# Patient Record
Sex: MALE | Employment: OTHER | URBAN - METROPOLITAN AREA
[De-identification: names, ages, dates, MRNs, and addresses within clinical notes are randomized per-mention and may not be internally consistent; named-entity substitution may affect disease eponyms.]

---

## 2017-01-01 ENCOUNTER — HOSPICE ADMISSION (OUTPATIENT)
Dept: HOSPICE | Facility: HOSPICE | Age: 82
End: 2017-01-01
Payer: MEDICARE

## 2017-01-01 ENCOUNTER — HOSPITAL ENCOUNTER (INPATIENT)
Age: 82
LOS: 12 days | End: 2017-03-28
Attending: INTERNAL MEDICINE | Admitting: INTERNAL MEDICINE

## 2017-01-01 ENCOUNTER — HOME CARE VISIT (OUTPATIENT)
Dept: HOSPICE | Facility: HOSPICE | Age: 82
End: 2017-01-01
Payer: MEDICARE

## 2017-01-01 VITALS
SYSTOLIC BLOOD PRESSURE: 62 MMHG | RESPIRATION RATE: 20 BRPM | HEART RATE: 80 BPM | DIASTOLIC BLOOD PRESSURE: 45 MMHG | TEMPERATURE: 97.5 F | OXYGEN SATURATION: 90 %

## 2017-01-01 DIAGNOSIS — G30.9 ALZHEIMER'S DEMENTIA WITH BEHAVIORAL DISTURBANCE, UNSPECIFIED TIMING OF DEMENTIA ONSET: ICD-10-CM

## 2017-01-01 DIAGNOSIS — T14.8XXA MULTIPLE WOUNDS OF SKIN: ICD-10-CM

## 2017-01-01 DIAGNOSIS — R45.1 AGITATION REQUIRING SEDATION PROTOCOL: ICD-10-CM

## 2017-01-01 DIAGNOSIS — A41.50 SEPSIS DUE TO GRAM NEGATIVE BACTERIA (HCC): ICD-10-CM

## 2017-01-01 DIAGNOSIS — F02.81 ALZHEIMER'S DEMENTIA WITH BEHAVIORAL DISTURBANCE, UNSPECIFIED TIMING OF DEMENTIA ONSET: ICD-10-CM

## 2017-01-01 PROCEDURE — 74011000250 HC RX REV CODE- 250: Performed by: INTERNAL MEDICINE

## 2017-01-01 PROCEDURE — 0656 HSPC GENERAL INPATIENT

## 2017-01-01 PROCEDURE — 74011250637 HC RX REV CODE- 250/637: Performed by: NURSE PRACTITIONER

## 2017-01-01 PROCEDURE — 0651 HSPC ROUTINE HOME CARE

## 2017-01-01 PROCEDURE — 74011250637 HC RX REV CODE- 250/637: Performed by: INTERNAL MEDICINE

## 2017-01-01 PROCEDURE — 74011000250 HC RX REV CODE- 250: Performed by: NURSE PRACTITIONER

## 2017-01-01 PROCEDURE — 74011250636 HC RX REV CODE- 250/636: Performed by: INTERNAL MEDICINE

## 2017-01-01 PROCEDURE — 99231 SBSQ HOSP IP/OBS SF/LOW 25: CPT | Performed by: INTERNAL MEDICINE

## 2017-01-01 PROCEDURE — 3336500001 HSPC ELECTION

## 2017-01-01 PROCEDURE — 99233 SBSQ HOSP IP/OBS HIGH 50: CPT | Performed by: INTERNAL MEDICINE

## 2017-01-01 PROCEDURE — 74011250636 HC RX REV CODE- 250/636: Performed by: NURSE PRACTITIONER

## 2017-01-01 PROCEDURE — 99232 SBSQ HOSP IP/OBS MODERATE 35: CPT | Performed by: FAMILY MEDICINE

## 2017-01-01 PROCEDURE — 74011250636 HC RX REV CODE- 250/636

## 2017-01-01 PROCEDURE — 99233 SBSQ HOSP IP/OBS HIGH 50: CPT | Performed by: FAMILY MEDICINE

## 2017-01-01 RX ORDER — NYSTATIN 100000 [USP'U]/G
POWDER TOPICAL 3 TIMES DAILY
Status: DISCONTINUED | OUTPATIENT
Start: 2017-01-01 | End: 2017-01-01 | Stop reason: SDUPTHER

## 2017-01-01 RX ORDER — HALOPERIDOL 5 MG/ML
2 INJECTION INTRAMUSCULAR
Status: DISCONTINUED | OUTPATIENT
Start: 2017-01-01 | End: 2017-01-01 | Stop reason: HOSPADM

## 2017-01-01 RX ORDER — GLYCOPYRROLATE 0.2 MG/ML
0.2 INJECTION INTRAMUSCULAR; INTRAVENOUS EVERY 4 HOURS
Status: DISCONTINUED | OUTPATIENT
Start: 2017-01-01 | End: 2017-01-01 | Stop reason: HOSPADM

## 2017-01-01 RX ORDER — SCOLOPAMINE TRANSDERMAL SYSTEM 1 MG/1
3 PATCH, EXTENDED RELEASE TRANSDERMAL
Status: DISCONTINUED | OUTPATIENT
Start: 2017-01-01 | End: 2017-01-01 | Stop reason: SDUPTHER

## 2017-01-01 RX ORDER — GLYCOPYRROLATE 0.2 MG/ML
0.2 INJECTION INTRAMUSCULAR; INTRAVENOUS EVERY 6 HOURS
Status: DISCONTINUED | OUTPATIENT
Start: 2017-01-01 | End: 2017-01-01

## 2017-01-01 RX ORDER — ACETAMINOPHEN 650 MG/1
650 SUPPOSITORY RECTAL
Status: DISCONTINUED | OUTPATIENT
Start: 2017-01-01 | End: 2017-01-01 | Stop reason: HOSPADM

## 2017-01-01 RX ORDER — AMOXICILLIN 250 MG
2 CAPSULE ORAL
Status: DISCONTINUED | OUTPATIENT
Start: 2017-01-01 | End: 2017-01-01 | Stop reason: SDUPTHER

## 2017-01-01 RX ORDER — HYDROMORPHONE HYDROCHLORIDE 2 MG/ML
1.5 INJECTION, SOLUTION INTRAMUSCULAR; INTRAVENOUS; SUBCUTANEOUS
Status: DISCONTINUED | OUTPATIENT
Start: 2017-01-01 | End: 2017-01-01

## 2017-01-01 RX ORDER — HALOPERIDOL 5 MG/ML
INJECTION INTRAMUSCULAR
Status: DISPENSED
Start: 2017-01-01 | End: 2017-01-01

## 2017-01-01 RX ORDER — GLYCOPYRROLATE 0.2 MG/ML
0.2 INJECTION INTRAMUSCULAR; INTRAVENOUS EVERY 4 HOURS
Status: DISCONTINUED | OUTPATIENT
Start: 2017-01-01 | End: 2017-01-01 | Stop reason: DRUGHIGH

## 2017-01-01 RX ORDER — LORAZEPAM 2 MG/ML
1 INJECTION INTRAMUSCULAR
Status: DISCONTINUED | OUTPATIENT
Start: 2017-01-01 | End: 2017-01-01 | Stop reason: HOSPADM

## 2017-01-01 RX ORDER — HYDROMORPHONE HYDROCHLORIDE 2 MG/ML
1 INJECTION, SOLUTION INTRAMUSCULAR; INTRAVENOUS; SUBCUTANEOUS EVERY 4 HOURS
Status: DISCONTINUED | OUTPATIENT
Start: 2017-01-01 | End: 2017-01-01

## 2017-01-01 RX ORDER — GLYCOPYRROLATE 0.2 MG/ML
0.2 INJECTION INTRAMUSCULAR; INTRAVENOUS
Status: DISCONTINUED | OUTPATIENT
Start: 2017-01-01 | End: 2017-01-01 | Stop reason: SDUPTHER

## 2017-01-01 RX ORDER — LORAZEPAM 2 MG/ML
0.5 INJECTION INTRAMUSCULAR EVERY 4 HOURS
Status: DISCONTINUED | OUTPATIENT
Start: 2017-01-01 | End: 2017-01-01 | Stop reason: HOSPADM

## 2017-01-01 RX ORDER — HYDROMORPHONE HYDROCHLORIDE 2 MG/ML
1 INJECTION, SOLUTION INTRAMUSCULAR; INTRAVENOUS; SUBCUTANEOUS
Status: DISCONTINUED | OUTPATIENT
Start: 2017-01-01 | End: 2017-01-01

## 2017-01-01 RX ORDER — ONDANSETRON 4 MG/1
4 TABLET, ORALLY DISINTEGRATING ORAL
Status: DISCONTINUED | OUTPATIENT
Start: 2017-01-01 | End: 2017-01-01 | Stop reason: HOSPADM

## 2017-01-01 RX ORDER — FACIAL-BODY WIPES
10 EACH TOPICAL DAILY PRN
Status: DISCONTINUED | OUTPATIENT
Start: 2017-01-01 | End: 2017-01-01

## 2017-01-01 RX ORDER — HYDROMORPHONE HYDROCHLORIDE 2 MG/ML
2 INJECTION, SOLUTION INTRAMUSCULAR; INTRAVENOUS; SUBCUTANEOUS
Status: DISCONTINUED | OUTPATIENT
Start: 2017-01-01 | End: 2017-01-01

## 2017-01-01 RX ORDER — SENNOSIDES 8.8 MG/5ML
5 LIQUID ORAL
Status: DISCONTINUED | OUTPATIENT
Start: 2017-01-01 | End: 2017-01-01

## 2017-01-01 RX ORDER — SCOLOPAMINE TRANSDERMAL SYSTEM 1 MG/1
1.5 PATCH, EXTENDED RELEASE TRANSDERMAL
Status: DISCONTINUED | OUTPATIENT
Start: 2017-01-01 | End: 2017-01-01

## 2017-01-01 RX ORDER — HYDROMORPHONE HYDROCHLORIDE 10 MG/ML
0.5 INJECTION INTRAMUSCULAR; INTRAVENOUS; SUBCUTANEOUS DAILY PRN
Status: DISCONTINUED | OUTPATIENT
Start: 2017-01-01 | End: 2017-01-01 | Stop reason: CLARIF

## 2017-01-01 RX ORDER — GLYCOPYRROLATE 0.2 MG/ML
0.2 INJECTION INTRAMUSCULAR; INTRAVENOUS EVERY 4 HOURS
Status: DISCONTINUED | OUTPATIENT
Start: 2017-01-01 | End: 2017-01-01 | Stop reason: SDUPTHER

## 2017-01-01 RX ORDER — FACIAL-BODY WIPES
10 EACH TOPICAL DAILY PRN
Status: DISCONTINUED | OUTPATIENT
Start: 2017-01-01 | End: 2017-01-01 | Stop reason: SDUPTHER

## 2017-01-01 RX ORDER — HYDROMORPHONE HYDROCHLORIDE 2 MG/ML
INJECTION, SOLUTION INTRAMUSCULAR; INTRAVENOUS; SUBCUTANEOUS
Status: DISPENSED
Start: 2017-01-01 | End: 2017-01-01

## 2017-01-01 RX ORDER — GLYCOPYRROLATE 0.2 MG/ML
0.2 INJECTION INTRAMUSCULAR; INTRAVENOUS
Status: DISCONTINUED | OUTPATIENT
Start: 2017-01-01 | End: 2017-01-01

## 2017-01-01 RX ORDER — AMOXICILLIN 250 MG
2 CAPSULE ORAL
Status: DISCONTINUED | OUTPATIENT
Start: 2017-01-01 | End: 2017-01-01 | Stop reason: HOSPADM

## 2017-01-01 RX ORDER — FACIAL-BODY WIPES
10 EACH TOPICAL DAILY PRN
Status: DISCONTINUED | OUTPATIENT
Start: 2017-01-01 | End: 2017-01-01 | Stop reason: HOSPADM

## 2017-01-01 RX ORDER — HALOPERIDOL 5 MG/ML
2 INJECTION INTRAMUSCULAR EVERY 4 HOURS
Status: DISCONTINUED | OUTPATIENT
Start: 2017-01-01 | End: 2017-01-01

## 2017-01-01 RX ORDER — NYSTATIN 100000 [USP'U]/G
POWDER TOPICAL 3 TIMES DAILY
Status: DISCONTINUED | OUTPATIENT
Start: 2017-01-01 | End: 2017-01-01 | Stop reason: HOSPADM

## 2017-01-01 RX ORDER — HYDROMORPHONE HYDROCHLORIDE 2 MG/ML
0.5 INJECTION, SOLUTION INTRAMUSCULAR; INTRAVENOUS; SUBCUTANEOUS EVERY 4 HOURS
Status: DISCONTINUED | OUTPATIENT
Start: 2017-01-01 | End: 2017-01-01

## 2017-01-01 RX ORDER — GLYCOPYRROLATE 0.2 MG/ML
0.2 INJECTION INTRAMUSCULAR; INTRAVENOUS
Status: DISCONTINUED | OUTPATIENT
Start: 2017-01-01 | End: 2017-01-01 | Stop reason: HOSPADM

## 2017-01-01 RX ORDER — HYDROMORPHONE HYDROCHLORIDE 2 MG/ML
2 INJECTION, SOLUTION INTRAMUSCULAR; INTRAVENOUS; SUBCUTANEOUS
Status: DISCONTINUED | OUTPATIENT
Start: 2017-01-01 | End: 2017-01-01 | Stop reason: HOSPADM

## 2017-01-01 RX ORDER — LORAZEPAM 2 MG/ML
1 CONCENTRATE ORAL
Status: DISCONTINUED | OUTPATIENT
Start: 2017-01-01 | End: 2017-01-01

## 2017-01-01 RX ORDER — HYDROMORPHONE HYDROCHLORIDE 2 MG/ML
2 INJECTION, SOLUTION INTRAMUSCULAR; INTRAVENOUS; SUBCUTANEOUS
Status: DISCONTINUED | OUTPATIENT
Start: 2017-01-01 | End: 2017-01-01 | Stop reason: SDUPTHER

## 2017-01-01 RX ORDER — GLYCOPYRROLATE 0.2 MG/ML
0.1 INJECTION INTRAMUSCULAR; INTRAVENOUS
Status: DISCONTINUED | OUTPATIENT
Start: 2017-01-01 | End: 2017-01-01

## 2017-01-01 RX ORDER — HALOPERIDOL 5 MG/ML
2 INJECTION INTRAMUSCULAR
Status: DISCONTINUED | OUTPATIENT
Start: 2017-01-01 | End: 2017-01-01 | Stop reason: SDUPTHER

## 2017-01-01 RX ORDER — LORAZEPAM 2 MG/ML
1 INJECTION INTRAMUSCULAR
Status: DISCONTINUED | OUTPATIENT
Start: 2017-01-01 | End: 2017-01-01 | Stop reason: SDUPTHER

## 2017-01-01 RX ORDER — ONDANSETRON 4 MG/1
4 TABLET, ORALLY DISINTEGRATING ORAL
Status: DISCONTINUED | OUTPATIENT
Start: 2017-01-01 | End: 2017-01-01 | Stop reason: SDUPTHER

## 2017-01-01 RX ORDER — HYDROMORPHONE HYDROCHLORIDE 2 MG/ML
3 INJECTION, SOLUTION INTRAMUSCULAR; INTRAVENOUS; SUBCUTANEOUS
Status: DISCONTINUED | OUTPATIENT
Start: 2017-01-01 | End: 2017-01-01 | Stop reason: HOSPADM

## 2017-01-01 RX ORDER — HYDROMORPHONE HYDROCHLORIDE 2 MG/ML
0.5 INJECTION, SOLUTION INTRAMUSCULAR; INTRAVENOUS; SUBCUTANEOUS
Status: DISCONTINUED | OUTPATIENT
Start: 2017-01-01 | End: 2017-01-01

## 2017-01-01 RX ORDER — HYDROMORPHONE HYDROCHLORIDE 2 MG/ML
0.5 INJECTION, SOLUTION INTRAMUSCULAR; INTRAVENOUS; SUBCUTANEOUS DAILY PRN
Status: DISCONTINUED | OUTPATIENT
Start: 2017-01-01 | End: 2017-01-01

## 2017-01-01 RX ORDER — ACETAMINOPHEN 650 MG/1
650 SUPPOSITORY RECTAL
Status: DISCONTINUED | OUTPATIENT
Start: 2017-01-01 | End: 2017-01-01 | Stop reason: SDUPTHER

## 2017-01-01 RX ORDER — SCOLOPAMINE TRANSDERMAL SYSTEM 1 MG/1
3 PATCH, EXTENDED RELEASE TRANSDERMAL
Status: DISCONTINUED | OUTPATIENT
Start: 2017-01-01 | End: 2017-01-01 | Stop reason: HOSPADM

## 2017-01-01 RX ADMIN — HYDROMORPHONE HYDROCHLORIDE 2 MG: 2 INJECTION, SOLUTION INTRAMUSCULAR; INTRAVENOUS; SUBCUTANEOUS at 01:56

## 2017-01-01 RX ADMIN — GLYCOPYRROLATE 0.2 MG: 0.2 INJECTION, SOLUTION INTRAMUSCULAR; INTRAVENOUS at 15:00

## 2017-01-01 RX ADMIN — NYSTATIN 15 G: 100000 POWDER TOPICAL at 08:09

## 2017-01-01 RX ADMIN — HALOPERIDOL LACTATE 2 MG: 5 INJECTION, SOLUTION INTRAMUSCULAR at 20:40

## 2017-01-01 RX ADMIN — HYDROMORPHONE HYDROCHLORIDE 1 MG: 2 INJECTION, SOLUTION INTRAMUSCULAR; INTRAVENOUS; SUBCUTANEOUS at 02:28

## 2017-01-01 RX ADMIN — HYDROMORPHONE HYDROCHLORIDE 2 MG: 2 INJECTION, SOLUTION INTRAMUSCULAR; INTRAVENOUS; SUBCUTANEOUS at 17:30

## 2017-01-01 RX ADMIN — HYDROMORPHONE HYDROCHLORIDE 0.5 MG: 2 INJECTION, SOLUTION INTRAMUSCULAR; INTRAVENOUS; SUBCUTANEOUS at 06:00

## 2017-01-01 RX ADMIN — GLYCOPYRROLATE 0.2 MG: 0.2 INJECTION, SOLUTION INTRAMUSCULAR; INTRAVENOUS at 00:50

## 2017-01-01 RX ADMIN — HYDROMORPHONE HYDROCHLORIDE 0.5 MG: 2 INJECTION, SOLUTION INTRAMUSCULAR; INTRAVENOUS; SUBCUTANEOUS at 22:12

## 2017-01-01 RX ADMIN — HALOPERIDOL LACTATE 2 MG: 5 INJECTION, SOLUTION INTRAMUSCULAR at 17:03

## 2017-01-01 RX ADMIN — HALOPERIDOL LACTATE 2 MG: 5 INJECTION, SOLUTION INTRAMUSCULAR at 23:00

## 2017-01-01 RX ADMIN — NYSTATIN: 100000 POWDER TOPICAL at 09:43

## 2017-01-01 RX ADMIN — HYDROMORPHONE HYDROCHLORIDE 0.5 MG: 2 INJECTION, SOLUTION INTRAMUSCULAR; INTRAVENOUS; SUBCUTANEOUS at 06:33

## 2017-01-01 RX ADMIN — HALOPERIDOL LACTATE 2 MG: 5 INJECTION, SOLUTION INTRAMUSCULAR at 13:38

## 2017-01-01 RX ADMIN — NYSTATIN 15 G: 100000 POWDER TOPICAL at 09:04

## 2017-01-01 RX ADMIN — HALOPERIDOL LACTATE 2 MG: 5 INJECTION, SOLUTION INTRAMUSCULAR at 15:48

## 2017-01-01 RX ADMIN — GLYCOPYRROLATE 0.2 MG: 0.2 INJECTION INTRAMUSCULAR; INTRAVENOUS at 19:58

## 2017-01-01 RX ADMIN — GLYCOPYRROLATE 0.2 MG: 0.2 INJECTION INTRAMUSCULAR; INTRAVENOUS at 21:21

## 2017-01-01 RX ADMIN — GLYCOPYRROLATE 0.2 MG: 0.2 INJECTION, SOLUTION INTRAMUSCULAR; INTRAVENOUS at 16:17

## 2017-01-01 RX ADMIN — HALOPERIDOL LACTATE 2 MG: 5 INJECTION, SOLUTION INTRAMUSCULAR at 09:50

## 2017-01-01 RX ADMIN — HYDROMORPHONE HYDROCHLORIDE 1 MG: 2 INJECTION, SOLUTION INTRAMUSCULAR; INTRAVENOUS; SUBCUTANEOUS at 02:00

## 2017-01-01 RX ADMIN — NYSTATIN: 100000 POWDER TOPICAL at 16:00

## 2017-01-01 RX ADMIN — HALOPERIDOL LACTATE 2 MG: 5 INJECTION, SOLUTION INTRAMUSCULAR at 23:20

## 2017-01-01 RX ADMIN — HYDROMORPHONE HYDROCHLORIDE 1 MG: 2 INJECTION, SOLUTION INTRAMUSCULAR; INTRAVENOUS; SUBCUTANEOUS at 23:20

## 2017-01-01 RX ADMIN — GLYCOPYRROLATE 0.2 MG: 0.2 INJECTION, SOLUTION INTRAMUSCULAR; INTRAVENOUS at 04:00

## 2017-01-01 RX ADMIN — HYDROMORPHONE HYDROCHLORIDE 1 MG: 2 INJECTION, SOLUTION INTRAMUSCULAR; INTRAVENOUS; SUBCUTANEOUS at 14:15

## 2017-01-01 RX ADMIN — LORAZEPAM 0.5 MG: 2 INJECTION INTRAMUSCULAR at 18:23

## 2017-01-01 RX ADMIN — HALOPERIDOL LACTATE 2 MG: 5 INJECTION, SOLUTION INTRAMUSCULAR at 22:12

## 2017-01-01 RX ADMIN — HALOPERIDOL LACTATE 2 MG: 5 INJECTION, SOLUTION INTRAMUSCULAR at 18:27

## 2017-01-01 RX ADMIN — HALOPERIDOL LACTATE 2 MG: 5 INJECTION, SOLUTION INTRAMUSCULAR at 05:00

## 2017-01-01 RX ADMIN — HYDROMORPHONE HYDROCHLORIDE 0.5 MG: 2 INJECTION, SOLUTION INTRAMUSCULAR; INTRAVENOUS; SUBCUTANEOUS at 02:38

## 2017-01-01 RX ADMIN — HYDROMORPHONE HYDROCHLORIDE 2 MG: 2 INJECTION, SOLUTION INTRAMUSCULAR; INTRAVENOUS; SUBCUTANEOUS at 09:04

## 2017-01-01 RX ADMIN — GLYCOPYRROLATE 0.2 MG: 0.2 INJECTION, SOLUTION INTRAMUSCULAR; INTRAVENOUS at 08:36

## 2017-01-01 RX ADMIN — NYSTATIN: 100000 POWDER TOPICAL at 08:06

## 2017-01-01 RX ADMIN — HYDROMORPHONE HYDROCHLORIDE 2 MG: 2 INJECTION, SOLUTION INTRAMUSCULAR; INTRAVENOUS; SUBCUTANEOUS at 11:17

## 2017-01-01 RX ADMIN — HALOPERIDOL LACTATE 2 MG: 5 INJECTION, SOLUTION INTRAMUSCULAR at 18:46

## 2017-01-01 RX ADMIN — GLYCOPYRROLATE 0.2 MG: 0.2 INJECTION, SOLUTION INTRAMUSCULAR; INTRAVENOUS at 08:31

## 2017-01-01 RX ADMIN — NYSTATIN: 100000 POWDER TOPICAL at 23:14

## 2017-01-01 RX ADMIN — GLYCOPYRROLATE 0.2 MG: 0.2 INJECTION, SOLUTION INTRAMUSCULAR; INTRAVENOUS at 03:19

## 2017-01-01 RX ADMIN — NYSTATIN: 100000 POWDER TOPICAL at 01:56

## 2017-01-01 RX ADMIN — HYDROMORPHONE HYDROCHLORIDE 1 MG: 2 INJECTION, SOLUTION INTRAMUSCULAR; INTRAVENOUS; SUBCUTANEOUS at 09:02

## 2017-01-01 RX ADMIN — HALOPERIDOL LACTATE 2 MG: 5 INJECTION, SOLUTION INTRAMUSCULAR at 08:37

## 2017-01-01 RX ADMIN — HALOPERIDOL LACTATE 2 MG: 5 INJECTION, SOLUTION INTRAMUSCULAR at 20:21

## 2017-01-01 RX ADMIN — GLYCOPYRROLATE 0.2 MG: 0.2 INJECTION, SOLUTION INTRAMUSCULAR; INTRAVENOUS at 20:40

## 2017-01-01 RX ADMIN — HYDROMORPHONE HYDROCHLORIDE 1.5 MG: 2 INJECTION, SOLUTION INTRAMUSCULAR; INTRAVENOUS; SUBCUTANEOUS at 08:05

## 2017-01-01 RX ADMIN — HALOPERIDOL LACTATE 2 MG: 5 INJECTION, SOLUTION INTRAMUSCULAR at 09:03

## 2017-01-01 RX ADMIN — HALOPERIDOL LACTATE 2 MG: 5 INJECTION, SOLUTION INTRAMUSCULAR at 11:15

## 2017-01-01 RX ADMIN — NYSTATIN: 100000 POWDER TOPICAL at 22:00

## 2017-01-01 RX ADMIN — HYDROMORPHONE HYDROCHLORIDE 1.5 MG: 2 INJECTION, SOLUTION INTRAMUSCULAR; INTRAVENOUS; SUBCUTANEOUS at 23:24

## 2017-01-01 RX ADMIN — HYDROMORPHONE HYDROCHLORIDE 2 MG: 2 INJECTION, SOLUTION INTRAMUSCULAR; INTRAVENOUS; SUBCUTANEOUS at 15:48

## 2017-01-01 RX ADMIN — HYDROMORPHONE HYDROCHLORIDE 2 MG: 2 INJECTION, SOLUTION INTRAMUSCULAR; INTRAVENOUS; SUBCUTANEOUS at 00:03

## 2017-01-01 RX ADMIN — HALOPERIDOL LACTATE 2 MG: 5 INJECTION, SOLUTION INTRAMUSCULAR at 17:33

## 2017-01-01 RX ADMIN — HALOPERIDOL LACTATE 2 MG: 5 INJECTION, SOLUTION INTRAMUSCULAR at 20:26

## 2017-01-01 RX ADMIN — HYDROMORPHONE HYDROCHLORIDE 1 MG: 2 INJECTION, SOLUTION INTRAMUSCULAR; INTRAVENOUS; SUBCUTANEOUS at 16:17

## 2017-01-01 RX ADMIN — HYDROMORPHONE HYDROCHLORIDE 3 MG: 2 INJECTION, SOLUTION INTRAMUSCULAR; INTRAVENOUS; SUBCUTANEOUS at 08:07

## 2017-01-01 RX ADMIN — HALOPERIDOL LACTATE 2 MG: 5 INJECTION, SOLUTION INTRAMUSCULAR at 02:26

## 2017-01-01 RX ADMIN — HYDROMORPHONE HYDROCHLORIDE 0.5 MG: 2 INJECTION, SOLUTION INTRAMUSCULAR; INTRAVENOUS; SUBCUTANEOUS at 13:58

## 2017-01-01 RX ADMIN — HALOPERIDOL LACTATE 2 MG: 5 INJECTION, SOLUTION INTRAMUSCULAR at 18:56

## 2017-01-01 RX ADMIN — HALOPERIDOL LACTATE 2 MG: 5 INJECTION, SOLUTION INTRAMUSCULAR at 14:24

## 2017-01-01 RX ADMIN — HALOPERIDOL LACTATE 2 MG: 5 INJECTION, SOLUTION INTRAMUSCULAR at 17:06

## 2017-01-01 RX ADMIN — GLYCOPYRROLATE 0.2 MG: 0.2 INJECTION, SOLUTION INTRAMUSCULAR; INTRAVENOUS at 16:35

## 2017-01-01 RX ADMIN — GLYCOPYRROLATE 0.2 MG: 0.2 INJECTION, SOLUTION INTRAMUSCULAR; INTRAVENOUS at 17:30

## 2017-01-01 RX ADMIN — HALOPERIDOL LACTATE 2 MG: 5 INJECTION, SOLUTION INTRAMUSCULAR at 15:54

## 2017-01-01 RX ADMIN — HALOPERIDOL LACTATE 2 MG: 5 INJECTION, SOLUTION INTRAMUSCULAR at 01:52

## 2017-01-01 RX ADMIN — GLYCOPYRROLATE 0.2 MG: 0.2 INJECTION INTRAMUSCULAR; INTRAVENOUS at 08:08

## 2017-01-01 RX ADMIN — HALOPERIDOL LACTATE 2 MG: 5 INJECTION, SOLUTION INTRAMUSCULAR at 08:13

## 2017-01-01 RX ADMIN — NYSTATIN: 100000 POWDER TOPICAL at 09:00

## 2017-01-01 RX ADMIN — LORAZEPAM 0.5 MG: 2 INJECTION INTRAMUSCULAR at 13:59

## 2017-01-01 RX ADMIN — ACETAMINOPHEN 650 MG: 650 SUPPOSITORY RECTAL at 16:41

## 2017-01-01 RX ADMIN — GLYCOPYRROLATE 0.2 MG: 0.2 INJECTION INTRAMUSCULAR; INTRAVENOUS at 09:03

## 2017-01-01 RX ADMIN — GLYCOPYRROLATE 0.2 MG: 0.2 INJECTION, SOLUTION INTRAMUSCULAR; INTRAVENOUS at 11:16

## 2017-01-01 RX ADMIN — HALOPERIDOL LACTATE 2 MG: 5 INJECTION, SOLUTION INTRAMUSCULAR at 03:19

## 2017-01-01 RX ADMIN — HYDROMORPHONE HYDROCHLORIDE 1.5 MG: 2 INJECTION, SOLUTION INTRAMUSCULAR; INTRAVENOUS; SUBCUTANEOUS at 02:39

## 2017-01-01 RX ADMIN — LORAZEPAM 0.5 MG: 2 INJECTION INTRAMUSCULAR at 21:43

## 2017-01-01 RX ADMIN — HALOPERIDOL LACTATE 2 MG: 5 INJECTION, SOLUTION INTRAMUSCULAR at 22:11

## 2017-01-01 RX ADMIN — HYDROMORPHONE HYDROCHLORIDE 1 MG: 2 INJECTION, SOLUTION INTRAMUSCULAR; INTRAVENOUS; SUBCUTANEOUS at 03:56

## 2017-01-01 RX ADMIN — GLYCOPYRROLATE 0.2 MG: 0.2 INJECTION, SOLUTION INTRAMUSCULAR; INTRAVENOUS at 02:13

## 2017-01-01 RX ADMIN — HALOPERIDOL LACTATE 2 MG: 5 INJECTION, SOLUTION INTRAMUSCULAR at 06:33

## 2017-01-01 RX ADMIN — HYDROMORPHONE HYDROCHLORIDE 0.5 MG: 10 INJECTION INTRAMUSCULAR; INTRAVENOUS; SUBCUTANEOUS at 10:56

## 2017-01-01 RX ADMIN — HALOPERIDOL LACTATE 2 MG: 5 INJECTION, SOLUTION INTRAMUSCULAR at 02:35

## 2017-01-01 RX ADMIN — HALOPERIDOL LACTATE 2 MG: 5 INJECTION, SOLUTION INTRAMUSCULAR at 18:31

## 2017-01-01 RX ADMIN — HALOPERIDOL LACTATE 2 MG: 5 INJECTION, SOLUTION INTRAMUSCULAR at 05:44

## 2017-01-01 RX ADMIN — NYSTATIN: 100000 POWDER TOPICAL at 17:00

## 2017-01-01 RX ADMIN — HYDROMORPHONE HYDROCHLORIDE 3 MG: 2 INJECTION, SOLUTION INTRAMUSCULAR; INTRAVENOUS; SUBCUTANEOUS at 01:42

## 2017-01-01 RX ADMIN — HYDROMORPHONE HYDROCHLORIDE 1 MG: 2 INJECTION, SOLUTION INTRAMUSCULAR; INTRAVENOUS; SUBCUTANEOUS at 08:13

## 2017-01-01 RX ADMIN — HALOPERIDOL LACTATE 2 MG: 5 INJECTION, SOLUTION INTRAMUSCULAR at 20:00

## 2017-01-01 RX ADMIN — HYDROMORPHONE HYDROCHLORIDE 0.5 MG: 2 INJECTION, SOLUTION INTRAMUSCULAR; INTRAVENOUS; SUBCUTANEOUS at 14:00

## 2017-01-01 RX ADMIN — HYDROMORPHONE HYDROCHLORIDE 2 MG: 2 INJECTION, SOLUTION INTRAMUSCULAR; INTRAVENOUS; SUBCUTANEOUS at 07:54

## 2017-01-01 RX ADMIN — HALOPERIDOL LACTATE 2 MG: 5 INJECTION, SOLUTION INTRAMUSCULAR at 13:10

## 2017-01-01 RX ADMIN — HALOPERIDOL LACTATE 2 MG: 5 INJECTION, SOLUTION INTRAMUSCULAR at 02:38

## 2017-01-01 RX ADMIN — HYDROMORPHONE HYDROCHLORIDE 3 MG: 2 INJECTION, SOLUTION INTRAMUSCULAR; INTRAVENOUS; SUBCUTANEOUS at 20:18

## 2017-01-01 RX ADMIN — HYDROMORPHONE HYDROCHLORIDE 2 MG: 2 INJECTION, SOLUTION INTRAMUSCULAR; INTRAVENOUS; SUBCUTANEOUS at 14:15

## 2017-01-01 RX ADMIN — HALOPERIDOL LACTATE 2 MG: 5 INJECTION, SOLUTION INTRAMUSCULAR at 14:14

## 2017-01-01 RX ADMIN — HYDROMORPHONE HYDROCHLORIDE 1.5 MG: 2 INJECTION, SOLUTION INTRAMUSCULAR; INTRAVENOUS; SUBCUTANEOUS at 11:47

## 2017-01-01 RX ADMIN — HALOPERIDOL LACTATE 2 MG: 5 INJECTION, SOLUTION INTRAMUSCULAR at 18:59

## 2017-01-01 RX ADMIN — GLYCOPYRROLATE 0.2 MG: 0.2 INJECTION, SOLUTION INTRAMUSCULAR; INTRAVENOUS at 11:46

## 2017-01-01 RX ADMIN — NYSTATIN: 100000 POWDER TOPICAL at 17:32

## 2017-01-01 RX ADMIN — HALOPERIDOL LACTATE 2 MG: 5 INJECTION, SOLUTION INTRAMUSCULAR at 11:16

## 2017-01-01 RX ADMIN — HYDROMORPHONE HYDROCHLORIDE 0.5 MG: 2 INJECTION, SOLUTION INTRAMUSCULAR; INTRAVENOUS; SUBCUTANEOUS at 18:46

## 2017-01-01 RX ADMIN — HYDROMORPHONE HYDROCHLORIDE 0.5 MG: 2 INJECTION, SOLUTION INTRAMUSCULAR; INTRAVENOUS; SUBCUTANEOUS at 02:35

## 2017-01-01 RX ADMIN — GLYCOPYRROLATE 0.2 MG: 0.2 INJECTION INTRAMUSCULAR; INTRAVENOUS at 01:01

## 2017-01-01 RX ADMIN — HALOPERIDOL LACTATE 2 MG: 5 INJECTION, SOLUTION INTRAMUSCULAR at 04:48

## 2017-01-01 RX ADMIN — HALOPERIDOL LACTATE 2 MG: 5 INJECTION, SOLUTION INTRAMUSCULAR at 09:00

## 2017-01-01 RX ADMIN — HALOPERIDOL LACTATE 2 MG: 5 INJECTION, SOLUTION INTRAMUSCULAR at 13:57

## 2017-01-01 RX ADMIN — HALOPERIDOL LACTATE 2 MG: 5 INJECTION, SOLUTION INTRAMUSCULAR at 23:43

## 2017-01-01 RX ADMIN — GLYCOPYRROLATE 0.2 MG: 0.2 INJECTION, SOLUTION INTRAMUSCULAR; INTRAVENOUS at 23:20

## 2017-01-01 RX ADMIN — HALOPERIDOL LACTATE 2 MG: 5 INJECTION, SOLUTION INTRAMUSCULAR at 23:12

## 2017-01-01 RX ADMIN — HYDROMORPHONE HYDROCHLORIDE 3 MG: 2 INJECTION, SOLUTION INTRAMUSCULAR; INTRAVENOUS; SUBCUTANEOUS at 04:39

## 2017-01-01 RX ADMIN — GLYCOPYRROLATE 0.2 MG: 0.2 INJECTION, SOLUTION INTRAMUSCULAR; INTRAVENOUS at 03:54

## 2017-01-01 RX ADMIN — HALOPERIDOL LACTATE 2 MG: 5 INJECTION, SOLUTION INTRAMUSCULAR at 14:00

## 2017-01-01 RX ADMIN — HYDROMORPHONE HYDROCHLORIDE 0.5 MG: 2 INJECTION, SOLUTION INTRAMUSCULAR; INTRAVENOUS; SUBCUTANEOUS at 04:29

## 2017-01-01 RX ADMIN — HYDROMORPHONE HYDROCHLORIDE 2 MG: 2 INJECTION, SOLUTION INTRAMUSCULAR; INTRAVENOUS; SUBCUTANEOUS at 05:53

## 2017-01-01 RX ADMIN — HALOPERIDOL LACTATE 2 MG: 5 INJECTION, SOLUTION INTRAMUSCULAR at 14:15

## 2017-01-01 RX ADMIN — HYDROMORPHONE HYDROCHLORIDE 1 MG: 2 INJECTION, SOLUTION INTRAMUSCULAR; INTRAVENOUS; SUBCUTANEOUS at 20:26

## 2017-01-01 RX ADMIN — LORAZEPAM 1 MG: 2 SOLUTION, CONCENTRATE ORAL at 07:47

## 2017-01-01 RX ADMIN — HYDROMORPHONE HYDROCHLORIDE 0.5 MG: 2 INJECTION, SOLUTION INTRAMUSCULAR; INTRAVENOUS; SUBCUTANEOUS at 22:11

## 2017-01-01 RX ADMIN — HALOPERIDOL LACTATE 2 MG: 5 INJECTION, SOLUTION INTRAMUSCULAR at 06:06

## 2017-01-01 RX ADMIN — HYDROMORPHONE HYDROCHLORIDE 0.5 MG: 2 INJECTION, SOLUTION INTRAMUSCULAR; INTRAVENOUS; SUBCUTANEOUS at 06:13

## 2017-01-01 RX ADMIN — HALOPERIDOL LACTATE 2 MG: 5 INJECTION, SOLUTION INTRAMUSCULAR at 09:43

## 2017-01-01 RX ADMIN — HALOPERIDOL LACTATE 2 MG: 5 INJECTION, SOLUTION INTRAMUSCULAR at 17:41

## 2017-01-01 RX ADMIN — GLYCOPYRROLATE 0.2 MG: 0.2 INJECTION, SOLUTION INTRAMUSCULAR; INTRAVENOUS at 23:00

## 2017-01-01 RX ADMIN — HYDROMORPHONE HYDROCHLORIDE 1.5 MG: 2 INJECTION, SOLUTION INTRAMUSCULAR; INTRAVENOUS; SUBCUTANEOUS at 05:43

## 2017-01-01 RX ADMIN — NYSTATIN: 100000 POWDER TOPICAL at 17:06

## 2017-01-01 RX ADMIN — NYSTATIN: 100000 POWDER TOPICAL at 22:47

## 2017-01-01 RX ADMIN — HYDROMORPHONE HYDROCHLORIDE 3 MG: 2 INJECTION, SOLUTION INTRAMUSCULAR; INTRAVENOUS; SUBCUTANEOUS at 23:47

## 2017-01-01 RX ADMIN — HYDROMORPHONE HYDROCHLORIDE 2 MG: 2 INJECTION, SOLUTION INTRAMUSCULAR; INTRAVENOUS; SUBCUTANEOUS at 03:20

## 2017-01-01 RX ADMIN — HALOPERIDOL LACTATE 2 MG: 5 INJECTION, SOLUTION INTRAMUSCULAR at 08:35

## 2017-01-01 RX ADMIN — HYDROMORPHONE HYDROCHLORIDE 1.5 MG: 2 INJECTION, SOLUTION INTRAMUSCULAR; INTRAVENOUS; SUBCUTANEOUS at 20:25

## 2017-01-01 RX ADMIN — HYDROMORPHONE HYDROCHLORIDE 2 MG: 2 INJECTION, SOLUTION INTRAMUSCULAR; INTRAVENOUS; SUBCUTANEOUS at 11:16

## 2017-01-01 RX ADMIN — HYDROMORPHONE HYDROCHLORIDE 2 MG: 2 INJECTION, SOLUTION INTRAMUSCULAR; INTRAVENOUS; SUBCUTANEOUS at 13:39

## 2017-01-01 RX ADMIN — HYDROMORPHONE HYDROCHLORIDE 3 MG: 2 INJECTION, SOLUTION INTRAMUSCULAR; INTRAVENOUS; SUBCUTANEOUS at 18:27

## 2017-01-01 RX ADMIN — HYDROMORPHONE HYDROCHLORIDE 1 MG: 2 INJECTION, SOLUTION INTRAMUSCULAR; INTRAVENOUS; SUBCUTANEOUS at 17:06

## 2017-01-01 RX ADMIN — GLYCOPYRROLATE 0.2 MG: 0.2 INJECTION INTRAMUSCULAR; INTRAVENOUS at 20:22

## 2017-01-01 RX ADMIN — GLYCOPYRROLATE 0.2 MG: 0.2 INJECTION, SOLUTION INTRAMUSCULAR; INTRAVENOUS at 20:55

## 2017-01-01 RX ADMIN — HALOPERIDOL LACTATE 2 MG: 5 INJECTION, SOLUTION INTRAMUSCULAR at 02:39

## 2017-01-01 RX ADMIN — HYDROMORPHONE HYDROCHLORIDE 2 MG: 2 INJECTION, SOLUTION INTRAMUSCULAR; INTRAVENOUS; SUBCUTANEOUS at 04:11

## 2017-01-01 RX ADMIN — HYDROMORPHONE HYDROCHLORIDE 2 MG: 2 INJECTION, SOLUTION INTRAMUSCULAR; INTRAVENOUS; SUBCUTANEOUS at 08:36

## 2017-01-01 RX ADMIN — HYDROMORPHONE HYDROCHLORIDE 0.5 MG: 2 INJECTION, SOLUTION INTRAMUSCULAR; INTRAVENOUS; SUBCUTANEOUS at 18:58

## 2017-01-01 RX ADMIN — HALOPERIDOL LACTATE 2 MG: 5 INJECTION, SOLUTION INTRAMUSCULAR at 11:57

## 2017-01-01 RX ADMIN — GLYCOPYRROLATE 0.2 MG: 0.2 INJECTION, SOLUTION INTRAMUSCULAR; INTRAVENOUS at 05:55

## 2017-01-01 RX ADMIN — NYSTATIN: 100000 POWDER TOPICAL at 21:45

## 2017-01-01 RX ADMIN — HYDROMORPHONE HYDROCHLORIDE 1 MG: 2 INJECTION, SOLUTION INTRAMUSCULAR; INTRAVENOUS; SUBCUTANEOUS at 17:04

## 2017-01-01 RX ADMIN — HYDROMORPHONE HYDROCHLORIDE 3 MG: 2 INJECTION, SOLUTION INTRAMUSCULAR; INTRAVENOUS; SUBCUTANEOUS at 14:00

## 2017-01-01 RX ADMIN — HYDROMORPHONE HYDROCHLORIDE 0.5 MG: 2 INJECTION, SOLUTION INTRAMUSCULAR; INTRAVENOUS; SUBCUTANEOUS at 01:41

## 2017-01-01 RX ADMIN — HYDROMORPHONE HYDROCHLORIDE 0.5 MG: 2 INJECTION, SOLUTION INTRAMUSCULAR; INTRAVENOUS; SUBCUTANEOUS at 02:04

## 2017-01-01 RX ADMIN — HALOPERIDOL LACTATE 2 MG: 5 INJECTION, SOLUTION INTRAMUSCULAR at 20:19

## 2017-01-01 RX ADMIN — HYDROMORPHONE HYDROCHLORIDE 2 MG: 2 INJECTION, SOLUTION INTRAMUSCULAR; INTRAVENOUS; SUBCUTANEOUS at 02:13

## 2017-01-01 RX ADMIN — HALOPERIDOL LACTATE 2 MG: 5 INJECTION, SOLUTION INTRAMUSCULAR at 15:07

## 2017-01-01 RX ADMIN — HALOPERIDOL LACTATE 2 MG: 5 INJECTION, SOLUTION INTRAMUSCULAR at 07:54

## 2017-01-01 RX ADMIN — GLYCOPYRROLATE 0.2 MG: 0.2 INJECTION, SOLUTION INTRAMUSCULAR; INTRAVENOUS at 01:55

## 2017-01-01 RX ADMIN — HALOPERIDOL LACTATE 2 MG: 5 INJECTION, SOLUTION INTRAMUSCULAR at 21:58

## 2017-01-01 RX ADMIN — GLYCOPYRROLATE 0.2 MG: 0.2 INJECTION INTRAMUSCULAR; INTRAVENOUS at 23:46

## 2017-01-01 RX ADMIN — HALOPERIDOL LACTATE 2 MG: 5 INJECTION, SOLUTION INTRAMUSCULAR at 05:53

## 2017-01-01 RX ADMIN — NYSTATIN: 100000 POWDER TOPICAL at 22:44

## 2017-01-01 RX ADMIN — HYDROMORPHONE HYDROCHLORIDE 1.5 MG: 2 INJECTION, SOLUTION INTRAMUSCULAR; INTRAVENOUS; SUBCUTANEOUS at 15:54

## 2017-01-01 RX ADMIN — HYDROMORPHONE HYDROCHLORIDE 0.5 MG: 2 INJECTION, SOLUTION INTRAMUSCULAR; INTRAVENOUS; SUBCUTANEOUS at 09:49

## 2017-01-01 RX ADMIN — HYDROMORPHONE HYDROCHLORIDE 1 MG: 2 INJECTION, SOLUTION INTRAMUSCULAR; INTRAVENOUS; SUBCUTANEOUS at 17:41

## 2017-01-01 RX ADMIN — GLYCOPYRROLATE 0.2 MG: 0.2 INJECTION, SOLUTION INTRAMUSCULAR; INTRAVENOUS at 13:03

## 2017-01-01 RX ADMIN — HYDROMORPHONE HYDROCHLORIDE 2 MG: 2 INJECTION, SOLUTION INTRAMUSCULAR; INTRAVENOUS; SUBCUTANEOUS at 01:01

## 2017-01-01 RX ADMIN — HYDROMORPHONE HYDROCHLORIDE 0.5 MG: 2 INJECTION, SOLUTION INTRAMUSCULAR; INTRAVENOUS; SUBCUTANEOUS at 09:00

## 2017-01-01 RX ADMIN — ACETAMINOPHEN 650 MG: 650 SUPPOSITORY RECTAL at 07:40

## 2017-01-01 RX ADMIN — GLYCOPYRROLATE 0.2 MG: 0.2 INJECTION INTRAMUSCULAR; INTRAVENOUS at 17:25

## 2017-01-01 RX ADMIN — GLYCOPYRROLATE 0.2 MG: 0.2 INJECTION, SOLUTION INTRAMUSCULAR; INTRAVENOUS at 12:52

## 2017-01-01 RX ADMIN — HYDROMORPHONE HYDROCHLORIDE 0.5 MG: 2 INJECTION, SOLUTION INTRAMUSCULAR; INTRAVENOUS; SUBCUTANEOUS at 21:21

## 2017-01-01 RX ADMIN — HYDROMORPHONE HYDROCHLORIDE 0.5 MG: 2 INJECTION, SOLUTION INTRAMUSCULAR; INTRAVENOUS; SUBCUTANEOUS at 18:47

## 2017-01-01 RX ADMIN — HALOPERIDOL LACTATE 2 MG: 5 INJECTION, SOLUTION INTRAMUSCULAR at 01:41

## 2017-01-01 RX ADMIN — HALOPERIDOL LACTATE 2 MG: 5 INJECTION, SOLUTION INTRAMUSCULAR at 06:11

## 2017-01-01 RX ADMIN — HALOPERIDOL LACTATE 2 MG: 5 INJECTION, SOLUTION INTRAMUSCULAR at 02:03

## 2017-01-01 RX ADMIN — HYDROMORPHONE HYDROCHLORIDE 1 MG: 2 INJECTION, SOLUTION INTRAMUSCULAR; INTRAVENOUS; SUBCUTANEOUS at 06:08

## 2017-01-01 RX ADMIN — HYDROMORPHONE HYDROCHLORIDE 2 MG: 2 INJECTION, SOLUTION INTRAMUSCULAR; INTRAVENOUS; SUBCUTANEOUS at 20:40

## 2017-01-01 RX ADMIN — HALOPERIDOL LACTATE 2 MG: 5 INJECTION, SOLUTION INTRAMUSCULAR at 11:17

## 2017-01-01 RX ADMIN — NYSTATIN: 100000 POWDER TOPICAL at 08:35

## 2017-01-01 RX ADMIN — GLYCOPYRROLATE 0.2 MG: 0.2 INJECTION, SOLUTION INTRAMUSCULAR; INTRAVENOUS at 05:42

## 2017-01-01 RX ADMIN — HYDROMORPHONE HYDROCHLORIDE 0.5 MG: 2 INJECTION, SOLUTION INTRAMUSCULAR; INTRAVENOUS; SUBCUTANEOUS at 09:42

## 2017-01-01 RX ADMIN — HALOPERIDOL LACTATE 2 MG: 5 INJECTION, SOLUTION INTRAMUSCULAR at 02:00

## 2017-01-01 RX ADMIN — HYDROMORPHONE HYDROCHLORIDE 2 MG: 2 INJECTION, SOLUTION INTRAMUSCULAR; INTRAVENOUS; SUBCUTANEOUS at 00:12

## 2017-01-01 RX ADMIN — HYDROMORPHONE HYDROCHLORIDE 1 MG: 2 INJECTION, SOLUTION INTRAMUSCULAR; INTRAVENOUS; SUBCUTANEOUS at 16:35

## 2017-01-01 RX ADMIN — HYDROMORPHONE HYDROCHLORIDE 2 MG: 2 INJECTION, SOLUTION INTRAMUSCULAR; INTRAVENOUS; SUBCUTANEOUS at 23:12

## 2017-01-01 RX ADMIN — HYDROMORPHONE HYDROCHLORIDE 1.5 MG: 2 INJECTION, SOLUTION INTRAMUSCULAR; INTRAVENOUS; SUBCUTANEOUS at 20:12

## 2017-01-01 RX ADMIN — HYDROMORPHONE HYDROCHLORIDE 0.5 MG: 2 INJECTION, SOLUTION INTRAMUSCULAR; INTRAVENOUS; SUBCUTANEOUS at 05:59

## 2017-01-01 RX ADMIN — GLYCOPYRROLATE 0.2 MG: 0.2 INJECTION, SOLUTION INTRAMUSCULAR; INTRAVENOUS at 20:26

## 2017-01-01 RX ADMIN — HYDROMORPHONE HYDROCHLORIDE 1 MG: 2 INJECTION, SOLUTION INTRAMUSCULAR; INTRAVENOUS; SUBCUTANEOUS at 05:00

## 2017-01-01 RX ADMIN — HYDROMORPHONE HYDROCHLORIDE 1 MG: 2 INJECTION, SOLUTION INTRAMUSCULAR; INTRAVENOUS; SUBCUTANEOUS at 14:00

## 2017-01-01 RX ADMIN — HYDROMORPHONE HYDROCHLORIDE 2 MG: 2 INJECTION, SOLUTION INTRAMUSCULAR; INTRAVENOUS; SUBCUTANEOUS at 11:15

## 2017-01-01 RX ADMIN — GLYCOPYRROLATE 0.2 MG: 0.2 INJECTION, SOLUTION INTRAMUSCULAR; INTRAVENOUS at 17:06

## 2017-01-01 RX ADMIN — GLYCOPYRROLATE 0.2 MG: 0.2 INJECTION, SOLUTION INTRAMUSCULAR; INTRAVENOUS at 11:15

## 2017-01-01 RX ADMIN — LORAZEPAM 1 MG: 2 SOLUTION, CONCENTRATE ORAL at 15:33

## 2017-01-01 RX ADMIN — HYDROMORPHONE HYDROCHLORIDE 1 MG: 2 INJECTION, SOLUTION INTRAMUSCULAR; INTRAVENOUS; SUBCUTANEOUS at 13:04

## 2017-01-01 RX ADMIN — HYDROMORPHONE HYDROCHLORIDE 2 MG: 2 INJECTION, SOLUTION INTRAMUSCULAR; INTRAVENOUS; SUBCUTANEOUS at 18:56

## 2017-01-01 RX ADMIN — HALOPERIDOL LACTATE 2 MG: 5 INJECTION, SOLUTION INTRAMUSCULAR at 17:32

## 2017-01-01 RX ADMIN — GLYCOPYRROLATE 0.2 MG: 0.2 INJECTION, SOLUTION INTRAMUSCULAR; INTRAVENOUS at 18:31

## 2017-01-01 RX ADMIN — ACETAMINOPHEN 650 MG: 650 SUPPOSITORY RECTAL at 20:33

## 2017-01-01 RX ADMIN — HALOPERIDOL LACTATE 2 MG: 5 INJECTION, SOLUTION INTRAMUSCULAR at 10:03

## 2017-01-01 RX ADMIN — GLYCOPYRROLATE 0.2 MG: 0.2 INJECTION INTRAMUSCULAR; INTRAVENOUS at 04:11

## 2017-01-01 RX ADMIN — GLYCOPYRROLATE 0.2 MG: 0.2 INJECTION, SOLUTION INTRAMUSCULAR; INTRAVENOUS at 11:57

## 2017-01-01 RX ADMIN — HYDROMORPHONE HYDROCHLORIDE 2 MG: 2 INJECTION, SOLUTION INTRAMUSCULAR; INTRAVENOUS; SUBCUTANEOUS at 17:07

## 2017-01-01 RX ADMIN — HYDROMORPHONE HYDROCHLORIDE 2 MG: 2 INJECTION, SOLUTION INTRAMUSCULAR; INTRAVENOUS; SUBCUTANEOUS at 05:17

## 2017-01-01 RX ADMIN — GLYCOPYRROLATE 0.2 MG: 0.2 INJECTION INTRAMUSCULAR; INTRAVENOUS at 04:41

## 2017-01-01 RX ADMIN — GLYCOPYRROLATE 0.2 MG: 0.2 INJECTION INTRAMUSCULAR; INTRAVENOUS at 11:44

## 2017-01-01 RX ADMIN — HYDROMORPHONE HYDROCHLORIDE 2 MG: 2 INJECTION, SOLUTION INTRAMUSCULAR; INTRAVENOUS; SUBCUTANEOUS at 22:49

## 2017-01-01 RX ADMIN — HYDROMORPHONE HYDROCHLORIDE 3 MG: 2 INJECTION, SOLUTION INTRAMUSCULAR; INTRAVENOUS; SUBCUTANEOUS at 11:43

## 2017-01-01 RX ADMIN — GLYCOPYRROLATE 0.2 MG: 0.2 INJECTION INTRAMUSCULAR; INTRAVENOUS at 15:08

## 2017-01-01 RX ADMIN — HYDROMORPHONE HYDROCHLORIDE 0.5 MG: 2 INJECTION, SOLUTION INTRAMUSCULAR; INTRAVENOUS; SUBCUTANEOUS at 09:59

## 2017-01-01 RX ADMIN — NYSTATIN: 100000 POWDER TOPICAL at 08:13

## 2017-01-01 RX ADMIN — LORAZEPAM 0.5 MG: 2 INJECTION INTRAMUSCULAR at 08:08

## 2017-01-01 RX ADMIN — HALOPERIDOL LACTATE 2 MG: 5 INJECTION, SOLUTION INTRAMUSCULAR at 05:59

## 2017-01-01 RX ADMIN — ACETAMINOPHEN 650 MG: 650 SUPPOSITORY RECTAL at 04:43

## 2017-01-01 RX ADMIN — ACETAMINOPHEN 650 MG: 650 SUPPOSITORY RECTAL at 22:44

## 2017-01-01 RX ADMIN — HALOPERIDOL LACTATE 2 MG: 5 INJECTION, SOLUTION INTRAMUSCULAR at 17:07

## 2017-01-01 RX ADMIN — HYDROMORPHONE HYDROCHLORIDE 0.5 MG: 2 INJECTION, SOLUTION INTRAMUSCULAR; INTRAVENOUS; SUBCUTANEOUS at 14:24

## 2017-01-01 RX ADMIN — HYDROMORPHONE HYDROCHLORIDE 2 MG: 2 INJECTION, SOLUTION INTRAMUSCULAR; INTRAVENOUS; SUBCUTANEOUS at 11:57

## 2017-01-01 RX ADMIN — HYDROMORPHONE HYDROCHLORIDE 0.5 MG: 2 INJECTION, SOLUTION INTRAMUSCULAR; INTRAVENOUS; SUBCUTANEOUS at 10:03

## 2017-01-01 RX ADMIN — HALOPERIDOL LACTATE 2 MG: 5 INJECTION, SOLUTION INTRAMUSCULAR at 20:12

## 2017-01-01 RX ADMIN — GLYCOPYRROLATE 0.2 MG: 0.2 INJECTION INTRAMUSCULAR; INTRAVENOUS at 09:54

## 2017-01-01 RX ADMIN — HALOPERIDOL LACTATE 2 MG: 5 INJECTION, SOLUTION INTRAMUSCULAR at 01:45

## 2017-01-01 RX ADMIN — HALOPERIDOL LACTATE 2 MG: 5 INJECTION, SOLUTION INTRAMUSCULAR at 04:11

## 2017-01-01 RX ADMIN — GLYCOPYRROLATE 0.2 MG: 0.2 INJECTION, SOLUTION INTRAMUSCULAR; INTRAVENOUS at 08:13

## 2017-01-01 RX ADMIN — HYDROMORPHONE HYDROCHLORIDE 2 MG: 2 INJECTION, SOLUTION INTRAMUSCULAR; INTRAVENOUS; SUBCUTANEOUS at 08:35

## 2017-01-01 RX ADMIN — LORAZEPAM 1 MG: 2 INJECTION INTRAMUSCULAR at 02:13

## 2017-01-01 RX ADMIN — HALOPERIDOL LACTATE 2 MG: 5 INJECTION, SOLUTION INTRAMUSCULAR at 01:00

## 2017-01-01 RX ADMIN — GLYCOPYRROLATE 0.2 MG: 0.2 INJECTION, SOLUTION INTRAMUSCULAR; INTRAVENOUS at 14:15

## 2017-01-01 RX ADMIN — HALOPERIDOL LACTATE 2 MG: 5 INJECTION, SOLUTION INTRAMUSCULAR at 08:05

## 2017-01-01 RX ADMIN — GLYCOPYRROLATE 0.2 MG: 0.2 INJECTION INTRAMUSCULAR; INTRAVENOUS at 18:22

## 2017-01-01 RX ADMIN — HALOPERIDOL LACTATE 2 MG: 5 INJECTION, SOLUTION INTRAMUSCULAR at 11:43

## 2017-01-01 RX ADMIN — HYDROMORPHONE HYDROCHLORIDE 2 MG: 2 INJECTION, SOLUTION INTRAMUSCULAR; INTRAVENOUS; SUBCUTANEOUS at 05:00

## 2017-01-01 RX ADMIN — HYDROMORPHONE HYDROCHLORIDE 2 MG: 2 INJECTION, SOLUTION INTRAMUSCULAR; INTRAVENOUS; SUBCUTANEOUS at 13:11

## 2017-01-01 RX ADMIN — NYSTATIN: 100000 POWDER TOPICAL at 02:05

## 2017-01-01 RX ADMIN — HYDROMORPHONE HYDROCHLORIDE 0.5 MG: 2 INJECTION, SOLUTION INTRAMUSCULAR; INTRAVENOUS; SUBCUTANEOUS at 17:33

## 2017-01-01 RX ADMIN — HALOPERIDOL LACTATE 2 MG: 5 INJECTION, SOLUTION INTRAMUSCULAR at 22:38

## 2017-01-01 RX ADMIN — HALOPERIDOL LACTATE 2 MG: 5 INJECTION, SOLUTION INTRAMUSCULAR at 06:00

## 2017-01-01 RX ADMIN — GLYCOPYRROLATE 0.2 MG: 0.2 INJECTION INTRAMUSCULAR; INTRAVENOUS at 01:42

## 2017-01-01 RX ADMIN — NYSTATIN: 100000 POWDER TOPICAL at 17:07

## 2017-01-01 RX ADMIN — HALOPERIDOL LACTATE 2 MG: 5 INJECTION, SOLUTION INTRAMUSCULAR at 21:21

## 2017-01-01 RX ADMIN — HALOPERIDOL LACTATE 2 MG: 5 INJECTION, SOLUTION INTRAMUSCULAR at 00:03

## 2017-01-01 RX ADMIN — HYDROMORPHONE HYDROCHLORIDE 2 MG: 2 INJECTION, SOLUTION INTRAMUSCULAR; INTRAVENOUS; SUBCUTANEOUS at 18:32

## 2017-01-01 RX ADMIN — HALOPERIDOL LACTATE 2 MG: 5 INJECTION, SOLUTION INTRAMUSCULAR at 22:48

## 2017-01-01 RX ADMIN — LORAZEPAM 1 MG: 2 INJECTION INTRAMUSCULAR at 10:13

## 2017-01-01 RX ADMIN — GLYCOPYRROLATE 0.2 MG: 0.2 INJECTION, SOLUTION INTRAMUSCULAR; INTRAVENOUS at 00:03

## 2017-01-01 RX ADMIN — GLYCOPYRROLATE 0.2 MG: 0.2 INJECTION, SOLUTION INTRAMUSCULAR; INTRAVENOUS at 23:13

## 2017-01-01 RX ADMIN — HYDROMORPHONE HYDROCHLORIDE 0.5 MG: 2 INJECTION, SOLUTION INTRAMUSCULAR; INTRAVENOUS; SUBCUTANEOUS at 15:06

## 2017-01-01 RX ADMIN — HYDROMORPHONE HYDROCHLORIDE 0.5 MG: 2 INJECTION, SOLUTION INTRAMUSCULAR; INTRAVENOUS; SUBCUTANEOUS at 21:58

## 2017-01-01 RX ADMIN — HALOPERIDOL LACTATE 2 MG: 5 INJECTION, SOLUTION INTRAMUSCULAR at 11:46

## 2017-01-01 RX ADMIN — HALOPERIDOL LACTATE 2 MG: 5 INJECTION, SOLUTION INTRAMUSCULAR at 08:08

## 2017-01-01 RX ADMIN — HYDROMORPHONE HYDROCHLORIDE 2 MG: 2 INJECTION, SOLUTION INTRAMUSCULAR; INTRAVENOUS; SUBCUTANEOUS at 20:19

## 2017-01-01 RX ADMIN — HYDROMORPHONE HYDROCHLORIDE 1 MG: 2 INJECTION, SOLUTION INTRAMUSCULAR; INTRAVENOUS; SUBCUTANEOUS at 22:42

## 2017-01-01 RX ADMIN — GLYCOPYRROLATE 0.2 MG: 0.2 INJECTION INTRAMUSCULAR; INTRAVENOUS at 11:18

## 2017-01-01 RX ADMIN — NYSTATIN: 100000 POWDER TOPICAL at 23:09

## 2017-01-01 RX ADMIN — LORAZEPAM 0.5 MG: 2 INJECTION INTRAMUSCULAR at 01:46

## 2017-01-01 RX ADMIN — LORAZEPAM 0.5 MG: 2 INJECTION INTRAMUSCULAR at 04:43

## 2017-01-01 RX ADMIN — HALOPERIDOL LACTATE 2 MG: 5 INJECTION, SOLUTION INTRAMUSCULAR at 13:03

## 2017-01-01 RX ADMIN — HALOPERIDOL LACTATE 2 MG: 5 INJECTION, SOLUTION INTRAMUSCULAR at 00:13

## 2017-03-16 PROBLEM — R45.1 AGITATION REQUIRING SEDATION PROTOCOL: Status: ACTIVE | Noted: 2017-01-01

## 2017-03-16 NOTE — PROGRESS NOTES
[facility]  [phone]    Patient Name: Benji Johnson YOB: 1928    Date of Provider Hospice Visit: 03/16/17    Level of Care:   [x] General Inpatient (GIP)    [] Routine   [] Respite    Location of Care:  [] [facility] [] [facility] [] [facility] [] [facility] [x] Hospice [facility]  [] Home [] Other:      Date of Original Hospice Admission: 3-16-17  Hospice Attending: Jeanne Davalos MD    Principle Hospice Diagnosis: dementia with behavioral disturbances  Diagnoses RELATED to the terminal prognosis: multiple advanced staged open wounds  Other Diagnoses: pt is [place] and legally blind, emaciated and cachectic     HOSPICE NARRATIVE COMPOSED BY PHYSICIAN   Rationale for a prognosis of life expectancy of 6 months or less if the disease follows its normal course:    Benji Johnson is a 80y.o. year old who was admitted to [facility] from [facility] Palliative Medicine. 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 Functionally, the patient's Palliative Performance Scale has declined over a period of weeks and is estimated at 20%. Objective information that support this patients limited prognosis includes: aspiration sepsis, wounds, cachexia, anorexia, severe dementia, severe agitation. The patient/family chose comfort measures with the support of Hospice. HOSPICE DIAGNOSES   Active Symptoms:  1. Severe agitation requiring constant evaluation   2. Multiple advanced stage wounds  3. Generalized pain  4. Shortness of breath  5. Profound weakness  6. Cachexia with severe muscle wasting  7. Dysphagia      PLAN   1.  Admit pt to [facility] for evaluation of severe agitation and pain which both are not well controlled at this time and for multiple advanced stage opened wounds  2. Haldol IV 2 mg q 4 hrs scheduled  3. Dilaudid IV 0.5mg q 4hrs  4. Oxygen 2l nc cont  5.  lorazepam if needed for increased agitation 1mg oral conc q 1 hr as needed  6. Dulcolax and senna  7. Wound care eval  8. Allow comfort feeds even with dysphagia and high risk aspiration    9.  and SW to support family needs  8. Disposition: home with family when symptoms are stable and he is managed by oral medications  11. All other comfort meds as needed    Prognosis estimated based on 03/16/17 clinical assessment is:   [] Few to Many Hours  [x] Few to Many Days    [] Few to Many Weeks    [] Few to Many Months    Communicated plan of care with: Hospice Case Manager; Hospice IDT; Care Team     GOALS OF CARE     Resuscitation Status: DNR  Durable DNR: [x] Yes [] No    No flowsheet data found.      HISTORY     History obtained from: chart, staff nurse, physician    CHIEF COMPLAINT: pt is agitated  The patient is:   [x] Verbal  [] Nonverbal  [] Unresponsive    HPI/SUBJECTIVE: 80year old man with end stage dementia and agitation, multiple wounds and severe cachexia malnutrition, dysphagia and recent sepsis       REVIEW OF SYSTEMS     The following systems were: [] reviewed  [x] unable to be reviewed    Positive ROS include:  Constitutional:  Ears/nose/mouth/throat:  Respiratory:  Gastrointestinal:  Musculoskeletal:  Neurologic:  Psychiatric:  Endocrine:     Adult Non-Verbal Pain Assessment Score: 6/10    Face  [] 0   No particular expression or smile  [] 1   Occasional grimace, tearing, frowning, wrinkled forehead  [x] 2   Frequent grimace, tearing, frowning, wrinkled forehead    Activity (movement)  [] 0   Lying quietly, normal position  [] 1   Seeking attention through movement or slow, cautious movement  [x] 2   Restless, excessive activity and/or withdrawal reflexes    Guarding  [] 0   Lying quietly, no positioning of hands over areas of body  [] 1   Splinting areas of the body, tense  [x] 2   Rigid, stiff    Physiology (vital signs)  [x] 0   Stable vital signs  [] 1   Change in any of the following: SBP > 20mm Hg; HR > 20/minute  [] 2   Change in any of the following: SBP > 30mm Hg; HR > 25/minute    Respiratory  [x] 0   Baseline RR/SpO2, compliant with ventilator  [] 1   RR > 10 above baseline, or 5% drop SpO2, mild asynchrony with ventilator  [] 2   RR > 20 above baseline, or 10% drop SpO2, asynchrony with ventilator     FUNCTIONAL ASSESSMENT     Palliative Performance Scale (PPS): 20%     PSYCHOSOCIAL/SPIRITUAL ASSESSMENT     Active Problems:    Agitation requiring sedation protocol (3/16/2017)      No past medical history on file. No past surgical history on file. Social History   Substance Use Topics    Smoking status: Not on file    Smokeless tobacco: Not on file    Alcohol use Not on file     No family history on file. Allergies not on file   No current facility-administered medications for this encounter. PHYSICAL EXAM     Wt Readings from Last 3 Encounters:   No data found for Wt       There were no vitals taken for this visit.     Supplemental O2  [x] Yes  [x] NO  Last bowel movement: unknown    Currently this patient has:  [x] Peripheral IV [] PICC  [] PORT [] ICD    [x] Rich Catheter [] NG Tube   [] PEG Tube    [] Rectal Tube [] Drain  [] Other:     Constitutional: awake, short of breath and agitated  Eyes: cl  ENMT: dry  Cardiovascular: HRR  Respiratory: sob with secretions   Gastrointestinal: snt bs+  Musculoskeletal:weakness noted with muscle wasting and cachexia  Skin: warm, dry  Neurologic: na  Psychiatric: agitated  Other:    Pertinent Lab and or Imaging Tests:  No results found for: NA, K, CL, CO2, AGAP, GLU, BUN, CREA, BUCR, GFRAA, GFRNA, CA, GFRAA  No results found for: TP, ALBR, TALB, ALB        Total time: 55  Counseling / coordination time: 45  > 50% counseling / coordination?: y        This patient meets Hospice General Inpatient (GIP) Level of Care.     The precipitating event that resulted in the need for GIP was: ongoing uncontrolled agitation in the facility   because pt is not responding to oral medications for symptom  Relief, IV haldol and thorazine    Supporting documentation for GIP need for pain control:  [x] Frequent evaluation by a doctor, nurse practitioner, nurse   [x] Frequent medication adjustment    [x] IVs that cannot be administered at home   [x] Aggressive pain management   [] Complicated technical delivery of medications              Supporting documentation for GIP need for symptom control:  [x]  Sudden decline necessitating intensive nursing intervention  []  Uncontrolled / intractable nausea or vomiting   []  Pathological fractures  [x]  Advanced open wounds requiring frequent skilled care  [] Unmanageable respiratory distress  [x] New or worsening delirium   [x] Delirium with behavior issues  [] Imminent death - with skilled nursing needs documented above  Larissa Nieves, NP

## 2017-03-16 NOTE — PROGRESS NOTES
1235 Patient arrived to the [facility] via medical transport. Patient transported with his personal belongings. Orders received from [facility], [address]. Patient assessed and repositioned in the bed. Patient moaned when turned, but patient refused pain medication. Patient stating, \"no medication because I have no pain whatsoever. \" Patient educated on signs and symptoms of pain and that he moaned when turned and that he would have to continue to be turned, but patient refused reporting \"I am perfectly comfortable right now. \" Per Gonzalo Shelton, nurse at [facility], and Chris Meehan, [facility], patient's wound care was performed at [facility] today. Consult for [employer], wound care nurse, put in and requested. Per Jhonatan Bhagat to determine wound care orders for patient. 532 [facility],  from [facility], here to visit  0 Patient's family friend visiting. Family friend reporting that she has hospice background and the family is looking for a different hospice and that they would like for the patient to be able to remain at [facility] after Monday. Raquel Lew, , contacted and given an update on this issue. 36 Patient's family friend at the nurse's station again reporting that the family is very upset and that they were not aware that the patient was being admitted to hospice and that they also did not know that the patient would have to be discharged from here on Monday. Denisha Peters  from [facility], also at the nurse's station. Denisha Peters and patient's friend to discuss the wishes of the family. 18 Patient's family friend at the nurse's station reporting that they only want to speak with the MSW from [employer]. 1500 Patient's family elected to transfer to [address] [place] [address]. 1640 Patient asleep. 1813 Patient repositioned. Patient agitated and angry when turned. Patient set up to consume some of his dinner. Family at the bedside and assisting patient with eating. 1845 Patient consumed about 25 percent of his dinner.  Patient restless, moving around in bed and calling out for his wife. Patient reported leg pain to his family member. Scheduled medications administered. Patient's son educated on patient's medications, understanding verbalized. 1900 Report given to Stu Orellana RN.        NAME OF PATIENT:  Kamran Quinteros. LEVEL OF CARE:  GIP    REASON FOR GIP:   Terminal agitation, despite changes to medications and wounds. *PATIENT REMAINS ELIGIBLE FOR GIP LEVEL OF CARE AS EVIDENCED BY: (MUST BE ADDRESSED OF PATIENT GIP)      REASON FOR RESPITE:  NA    O2 SAFETY:  NA    FALL INTERVENTIONS PROVIDED:   Implemented/recommended resources for alarm system (personal alarm, bed alarm, call bell, etc.)  and Implemented/recommended environmental changes (remove hazards, lower bed, improve lighting, etc.)    INTERDISPLINARY COMMUNICATION/COLLABORATION:  Physician, MSW, [place] and RN, CNA    NEW MEDICATION INITIATION DOCUMENTATION:  NA    Reason medication is being initiated:  NA    MD / Provider name consulted re: change in status / initiation of new medication:  NA    New Symptom(s):  NA  New Order(s):  NA    Name of the person notified of the changes:  NA    Name of person being taught:  NA    Instructions given:  NA    Side Effects taught:  NA    Response to teaching:  NA      COMFORTABLE DYING MEASURE:  Is Patient/family satisfied with symptom level?  yes    DISCHARGE PLAN:  Patient to discharge home to the care of his family or to be placed in a SNF.

## 2017-03-17 NOTE — PROGRESS NOTES
Verbal shift change report given to Jason Gruber RN by Alexandra Lucero RN. Report included the following information SBAR, Kardex, Intake/Output and MAR.     1920  Patient resting quietly on first rounds. 0017  Left forearm IV site lost.  Replaced with left forearm 20 gauge. Patient shaved and repositioned to right side. NAME OF PATIENT:  Karel Styles. LEVEL OF CARE:  GIP    REASON FOR GIP:   Terminal agitation, despite changes to medications, Medication adjustment that must be monitored 24/7, Stabilizing treatment that cannot take place at home and multiple wounds with frequent wound care. PATIENT REMAINS ELIGIBLE FOR GIP LEVEL OF CARE AS EVIDENCED BY:  Patient GIP for evaluation of severe agitation and pain which both are not well controlled at this time and for multiple advanced stage opened wounds. REASON FOR RESPITE:  Patient not in Respite Care at this time. O2 SAFETY:  Patient on room air. FALL INTERVENTIONS PROVIDED:   Implemented/recommended use of fall risk identification flag to all team members and Implemented/recommended resources for alarm system (personal alarm, bed alarm, call bell, etc.)     INTERDISPLINARY COMMUNICATION/COLLABORATION:  Physician, MSW, [place] and RN, CNA    NEW MEDICATION INITIATION DOCUMENTATION:  No new medications nor interventions begun on this night shift. Reason medication is being initiated:  N/A    MD / Provider name consulted re: change in status / initiation of new medication:  N/A    New Symptom(s):  N/A    New Order(s):  N/A    Name of the person notified of the changes:  N/A    Name of person being taught:  N/A    Instructions given:  N/A    Side Effects taught:  N/A    Response to teaching:  N/A    COMFORTABLE DYING MEASURE:  Monitor for pain and agitation and intervene accordingly.       Is Patient/family satisfied with symptom level?  yes    DISCHARGE PLAN:  Patient to be discharged home under care of [address] [place] [address] symptoms can be effectively managed there.

## 2017-03-17 NOTE — PROGRESS NOTES
[facility]  [phone]    Patient Name: Marissa Jin YOB: 1928    Date of Provider Hospice Visit: 03/17/17    Level of Care:   [x] General Inpatient (GIP)    [] Routine   [] Respite    Location of Care:  [] [facility] [] [facility] [] [facility] [] [facility] [x] Hospice [facility]  [] Home [] Other:      Date of Original Hospice Admission: 3-16-17  Hospice Attending: Manuel Crews MD    Principle Hospice Diagnosis: dementia with behavioral disturbances  Diagnoses RELATED to the terminal prognosis: multiple advanced staged open wounds  Other Diagnoses: pt is [place] and legally blind, emaciated and cachectic     HOSPICE NARRATIVE COMPOSED BY PHYSICIAN   Rationale for a prognosis of life expectancy of 6 months or less if the disease follows its normal course:    Marissa Jin is a 80y.o. year old who was admitted to [facility] from [facility] Pall[facility] Medicine. 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 Functionally, the patient's Palliative Performance Scale has declined over a period of weeks and is estimated at 20%. Objective information that support this patients limited prognosis includes: aspiration sepsis, wounds, cachexia, anorexia, severe dementia, severe agitation. The patient/family chose comfort measures with the support of Hospice. HOSPICE DIAGNOSES   Active Symptoms:  1. Severe agitation requiring constant evaluation   2. Multiple advanced stage wounds  3. Generalized pain  4. Shortness of breath  5. Profound weakness  6. Cachexia with severe muscle wasting  7. Dysphagia      PLAN   1.  Admit pt to [facility] for evaluation of severe agitation and pain which both are not well controlled at this time and for multiple advanced stage opened wounds  2. Haldol IV 2 mg q 4 hrs scheduled, he has needed the scheduled dosing due to severe agitation when he is not medicated  3. Dilaudid IV 0.5mg q 4hrs and prior to dressing change, he has many non verbal pan indicatorsi  4. Oxygen 2l nc cont  5.  lorazepam if needed for increased agitation 1mg oral conc q 1 hr as needed, he is more calm now  6. Dulcolax and senna  7. Continue  wound care 2 x day for drainage and keep dry as much as possible  8. Allow comfort feeds even with dysphagia and high risk aspiration  9. Oxygen for comfort    10.  and SW to support family needs  6. Disposition: home with family when symptoms are stable and he is managed by oral medications  12. All other comfort meds as needed    Prognosis estimated based on 03/17/17 clinical assessment is:   [] Few to Many Hours  [x] Few to Many Days    [] Few to Many Weeks    [] Few to Many Months    Communicated plan of care with: Hospice Case Manager; Hospice IDT; Care Team     GOALS OF CARE     Resuscitation Status: DNR  Durable DNR: [x] Yes [] No    No flowsheet data found.      HISTORY     History obtained from: chart, staff nurse, physician    CHIEF COMPLAINT: pt is agitated  The patient is:   [x] Verbal  [] Nonverbal  [] Unresponsive    HPI/SUBJECTIVE: 80year old man with end stage dementia and agitation, multiple wounds and severe cachexia malnutrition, dysphagia and recent sepsis       REVIEW OF SYSTEMS     The following systems were: [] reviewed  [x] unable to be reviewed    Positive ROS include:  Constitutional:  Ears/nose/mouth/throat:  Respiratory:  Gastrointestinal:  Musculoskeletal:  Neurologic:  Psychiatric:  Endocrine:     Adult Non-Verbal Pain Assessment Score:7/10    Face  [] 0   No particular expression or smile  [] 1   Occasional grimace, tearing, frowning, wrinkled forehead  [x] 2   Frequent grimace, tearing, frowning, wrinkled forehead    Activity (movement)  [] 0   Lying quietly, normal position  [] 1   Seeking attention through movement or slow, cautious movement  [x] 2   Restless, excessive activity and/or withdrawal reflexes    Guarding  [] 0   Lying quietly, no positioning of hands over areas of body  [] 1   Splinting areas of the body, tense  [x] 2   Rigid, stiff    Physiology (vital signs)  [x] 0   Stable vital signs  [] 1   Change in any of the following: SBP > 20mm Hg; HR > 20/minute  [] 2   Change in any of the following: SBP > 30mm Hg; HR > 25/minute    Respiratory  [] 0   Baseline RR/SpO2, compliant with ventilator  [x] 1   RR > 10 above baseline, or 5% drop SpO2, mild asynchrony with ventilator  [] 2   RR > 20 above baseline, or 10% drop SpO2, asynchrony with ventilator     FUNCTIONAL ASSESSMENT     Palliative Performance Scale (PPS): 20%     PSYCHOSOCIAL/SPIRITUAL ASSESSMENT     Active Problems:    Agitation requiring sedation protocol (3/16/2017)      No past medical history on file. No past surgical history on file. Social History   Substance Use Topics    Smoking status: Not on file    Smokeless tobacco: Not on file    Alcohol use Not on file     No family history on file.    No Known Allergies   Current Facility-Administered Medications   Medication Dose Route Frequency    nystatin (MYCOSTATIN) 100,000 unit/gram powder   Topical TID    HYDROmorphone (PF) (DILAUDID) 2 mg/mL injection        HYDROmorphone (PF) (DILAUDID) injection 0.5 mg  0.5 mg IntraVENous DAILY PRN    HYDROmorphone (PF) (DILAUDID) injection 0.5 mg  0.5 mg IntraVENous Q4H    acetaminophen (TYLENOL) suppository 650 mg  650 mg Rectal Q4H PRN    senna-docusate (PERICOLACE) 8.6-50 mg per tablet 2 Tab  2 Tab Oral BID PRN    bisacodyl (DULCOLAX) suppository 10 mg  10 mg Rectal DAILY PRN    ondansetron (ZOFRAN ODT) tablet 4 mg  4 mg Oral Q6H PRN    LORazepam (INTENSOL) 2 mg/mL oral concentrate 1 mg  1 mg Oral Q1H PRN    glycopyrrolate (ROBINUL) injection 0.2 mg  0.2 mg IntraVENous Q4H PRN    haloperidol lactate (HALDOL) injection 2 mg  2 mg IntraVENous Q4H        PHYSICAL EXAM     Wt Readings from Last 3 Encounters:   No data found for Wt       Visit Vitals    /68    Pulse 72    Temp 99.3 °F (37.4 °C)    Resp 22    SpO2 (!) 86%       Supplemental O2  [x] Yes  [x] NO  Last bowel movement: unknown    Currently this patient has:  [x] Peripheral IV [] PICC  [] PORT [] ICD    [x] Rich Catheter [] NG Tube   [] PEG Tube    [] Rectal Tube [] Drain  [] Other:     Constitutional: awake, short of breath and agitated  Eyes: cl  ENMT: dry  Cardiovascular: HRR  Respiratory: sob with secretions   Gastrointestinal: snt bs+  Musculoskeletal:weakness noted with muscle wasting and cachexia  Skin: warm, dry  Neurologic: na  Psychiatric: agitated  Other:    Pertinent Lab and or Imaging Tests:  No results found for: NA, K, CL, CO2, AGAP, GLU, BUN, CREA, BUCR, GFRAA, GFRNA, CA, GFRAA  No results found for: TP, ALBR, TALB, ALB        Total time: 55  Counseling / coordination time: 45  > 50% counseling / coordination?: y        This patient meets Hospice General Inpatient (GIP) Level of Care.     The precipitating event that resulted in the need for GIP was: ongoing uncontrolled agitation in the facility   because pt is not responding to oral medications for symptom  Relief, IV haldol and thorazine    Supporting documentation for GIP need for pain control:  [x] Frequent evaluation by a doctor, nurse practitioner, nurse   [x] Frequent medication adjustment    [x] IVs that cannot be administered at home   [x] Aggressive pain management   [] Complicated technical delivery of medications              Supporting documentation for GIP need for symptom control:  [x]  Sudden decline necessitating intensive nursing intervention  []  Uncontrolled / intractable nausea or vomiting   []  Pathological fractures  [x]  Advanced open wounds requiring frequent skilled care  [] Unmanageable respiratory distress  [x] New or worsening delirium   [x] Delirium with behavior issues  [] Imminent death - with skilled nursing needs documented above  Uyen English NP

## 2017-03-17 NOTE — PROGRESS NOTES
0700 Report received from [facility].   6705 Patient found asleep. Patient remained asleep during assessment. No signs nor symptoms of pain/discomfort noted. 0930 Patient asleep. 1056 Patient moaning. Patient given a PRN dose of dilaudid to pre-medicate for wound care. 1306 [facility] wound care nurse at [facility] assessing the patient's wounds. 1130 Wound care completed by Sandra Ceron to write new orders for the patient's wound care. 1300 Patient sitting up in bed being fed lunch. 1420 Patient in bed. Patient restless, leaning against the side rails of the bed and fidgeting with the covers. 1600 Patient asleep. 1800 Patient's family at the bedside. 1845 Patient given his scheduled medications. Patient's DIL and grandson educated on the signs and symptoms of EOL, understanding verbalized. NAME OF PATIENT:  Princess Mcqueen. LEVEL OF CARE:  GIP    REASON FOR GIP:   Terminal agitation, despite changes to medications and wound care management. *PATIENT REMAINS ELIGIBLE FOR GIP LEVEL OF CARE AS EVIDENCED BY: Adjustments in medications that can't take place in the home. REASON FOR RESPITE:  NA    O2 SAFETY:  NA    FALL INTERVENTIONS PROVIDED:   Implemented/recommended resources for alarm system (personal alarm, bed alarm, call bell, etc.)  and Implemented/recommended environmental changes (remove hazards, lower bed, improve lighting, etc.)    INTERDISPLINARY COMMUNICATION/COLLABORATION:  Physician, MSW and [place], RN    NEW MEDICATION INITIATION DOCUMENTATION:  NA    Reason medication is being initiated:  NA    MD / Provider name consulted re: change in status / initiation of new medication:  NA    New Symptom(s):  NA    New Order(s):  NA    Name of the person notified of the changes:  NA    Name of person being taught:  NA    Instructions given:  NA    Side Effects taught:  NA    Response to teaching:  NA      COMFORTABLE DYING MEASURE:  Is Patient/family satisfied with symptom level? yes    DISCHARGE PLAN:  Patient to discharge home to the care of his family or a SNF once symptoms are managed.

## 2017-03-17 NOTE — PROGRESS NOTES
Verbal shift change report given to Natty Callahna RN by Shruthi Swain RN.  Report included the following information SBAR, Kardex, Intake/Output and MAR.      NAME OF PATIENT: Kamran Quinteros.     LEVEL OF CARE: [facility]     REASON FOR GIP:   Terminal agitation, despite changes to medications, Medication adjustment that must be monitored 24/7, Stabilizing treatment that cannot take place at home and multiple wounds with frequent wound care.     PATIENT REMAINS ELIGIBLE FOR GIP LEVEL OF CARE AS EVIDENCED BY: Patient GIP for evaluation of severe agitation and pain which both are not well controlled at this time and for multiple advanced stage opened wounds.     REASON FOR RESPITE:  Patient not in Respite Care at this time.     O2 SAFETY: Patient on room air.      FALL INTERVENTIONS PROVIDED:   Implemented/recommended use of fall risk identification flag to all team members and Implemented/recommended resources for alarm system (personal alarm, bed alarm, call bell, etc.)      INTERDISPLINARY COMMUNICATION/COLLABORATION:  Physician, MSW, Yocasta and RN, CNA     NEW MEDICATION INITIATION DOCUMENTATION:  No new medications nor interventions begun on this night shift.      Reason medication is being initiated: N/A     MD / Provider name consulted re: change in status / initiation of new medication: N/A     New Symptom(s): N/A     New Order(s): N/A     Name of the person notified of the changes: N/A     Name of person being taught: N/A     Instructions given: N/A     Side Effects taught: N/A     Response to teaching: N/A     COMFORTABLE DYING MEASURE: Monitor for pain and agitation and intervene accordingly.      Is Patient/family satisfied with symptom level? yes     DISCHARGE PLAN: Patient to be discharged home under care of [facility] once symptoms can be effectively managed there.

## 2017-03-18 NOTE — PROGRESS NOTES
[address] [place] [address] [facility]  [phone]    Patient Name: Beena Monroe YOB: 1928    Date of Provider Hospice Visit: 03/18/17    Level of Care:   [x] General Inpatient (GIP)    [] Routine   [] Respite    Location of Care:  [] [facility] [] [facility] [] [facility] [] [facility] [x] Hospice [facility]  [] Home [] Other:      Date of Original Hospice Admission: 3-16-17  Hospice Attending: Moise Sky MD    Principle Hospice Diagnosis: dementia with behavioral disturbances  Diagnoses RELATED to the terminal prognosis: multiple advanced staged open wounds  Other Diagnoses: pt is [place] and legally blind, emaciated and cachectic     HOSPICE NARRATIVE COMPOSED BY PHYSICIAN   Rationale for a prognosis of life expectancy of 6 months or less if the disease follows its normal course:    Beena Monroe is a 80y.o. year old who was admitted to [facility] from [facility] inpatient Palliative Medicine. 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 Functionally, the patient's Palliative Performance Scale has declined over a period of weeks and is estimated at 20%. Objective information that support this patients limited prognosis includes: aspiration sepsis, wounds, cachexia, anorexia, severe dementia, severe agitation. The patient/family chose comfort measures with the support of Hospice. HOSPICE DIAGNOSES   Active Symptoms:  1. Severe agitation requiring constant evaluation   2. Multiple advanced stage wounds  3. Generalized pain  4. Shortness of breath  5. Profound weakness  6. Cachexia with severe muscle wasting  7. Dysphagia      PLAN   1.  Admit pt to [facility] for evaluation of severe agitation and pain which both are not well controlled at this time and for multiple advanced stage opened wounds  2. Haldol IV 2 mg q 4 hrs scheduled, he has needed the scheduled dosing due to severe agitation when he is not medicated  3. Dilaudid IV 0.5mg q 4hrs and prior to dressing change, he has many non verbal pan indicatorsi  4. Oxygen 2l nc cont  5.  lorazepam if needed for increased agitation 1mg oral conc q 1 hr as needed, he is more calm now  6. Dulcolax and senna  7. Continue  wound care 2 x day for drainage and keep dry as much as possible  8. Allow comfort feeds even with dysphagia and high risk aspiration. Hold oral intake if lethargic -d/w RN  9. Oxygen for comfort    10.  and SW to support family needs  6. Disposition: home with family when symptoms are stable and he is managed by oral medications  12. All other comfort meds as needed    Prognosis estimated based on 03/18/17 clinical assessment is:   [] Few to Many Hours  [x] Few to Many Days    [] Few to Many Weeks    [] Few to Many Months    Communicated plan of care with: Hospice Case Manager; Hospice IDT; Care Team     GOALS OF CARE     Resuscitation Status: DNR  Durable DNR: [x] Yes [] No    No flowsheet data found.      HISTORY     History obtained from: chart, staff nurse, physician    CHIEF COMPLAINT: pt is agitated  The patient is:   [x] Verbal  [] Nonverbal  [] Unresponsive    HPI/SUBJECTIVE: 80year old man with end stage dementia and agitation, multiple wounds and severe cachexia malnutrition, dysphagia and recent sepsis    Was able to eat a burger yesterday, but appears very lethargic today       REVIEW OF SYSTEMS     The following systems were: [] reviewed  [x] unable to be reviewed    Positive ROS include:  Constitutional:  Ears/nose/mouth/throat:  Respiratory:  Gastrointestinal:  Musculoskeletal:  Neurologic:  Psychiatric:  Endocrine:     Adult Non-Verbal Pain Assessment Score:7/10    Face  [] 0   No particular expression or smile  [] 1   Occasional grimace, tearing, frowning, wrinkled forehead  [x] 2   Frequent grimace, tearing, frowning, wrinkled forehead    Activity (movement)  [] 0   Lying quietly, normal position  [] 1   Seeking attention through movement or slow, cautious movement  [x] 2   Restless, excessive activity and/or withdrawal reflexes    Guarding  [] 0   Lying quietly, no positioning of hands over areas of body  [] 1   Splinting areas of the body, tense  [x] 2   Rigid, stiff    Physiology (vital signs)  [x] 0   Stable vital signs  [] 1   Change in any of the following: SBP > 20mm Hg; HR > 20/minute  [] 2   Change in any of the following: SBP > 30mm Hg; HR > 25/minute    Respiratory  [] 0   Baseline RR/SpO2, compliant with ventilator  [x] 1   RR > 10 above baseline, or 5% drop SpO2, mild asynchrony with ventilator  [] 2   RR > 20 above baseline, or 10% drop SpO2, asynchrony with ventilator     FUNCTIONAL ASSESSMENT     Palliative Performance Scale (PPS): 20%     PSYCHOSOCIAL/SPIRITUAL ASSESSMENT     Active Problems:    Agitation requiring sedation protocol (3/16/2017)      No past medical history on file. No past surgical history on file. Social History   Substance Use Topics    Smoking status: Not on file    Smokeless tobacco: Not on file    Alcohol use Not on file     No family history on file.    No Known Allergies   Current Facility-Administered Medications   Medication Dose Route Frequency    nystatin (MYCOSTATIN) 100,000 unit/gram powder   Topical TID    HYDROmorphone (PF) (DILAUDID) injection 0.5 mg  0.5 mg IntraVENous DAILY PRN    HYDROmorphone (PF) (DILAUDID) injection 0.5 mg  0.5 mg IntraVENous Q4H    acetaminophen (TYLENOL) suppository 650 mg  650 mg Rectal Q4H PRN    senna-docusate (PERICOLACE) 8.6-50 mg per tablet 2 Tab  2 Tab Oral BID PRN    bisacodyl (DULCOLAX) suppository 10 mg  10 mg Rectal DAILY PRN    ondansetron (ZOFRAN ODT) tablet 4 mg  4 mg Oral Q6H PRN    LORazepam (INTENSOL) 2 mg/mL oral concentrate 1 mg  1 mg Oral Q1H PRN    glycopyrrolate (ROBINUL) injection 0.2 mg  0.2 mg IntraVENous Q4H PRN    haloperidol lactate (HALDOL) injection 2 mg  2 mg IntraVENous Q4H        PHYSICAL EXAM     Wt Readings from Last 3 Encounters:   No data found for Wt       Visit Vitals    /89 (BP 1 Location: Right arm)    Pulse 68    Temp 99.5 °F (37.5 °C)    Resp 16    SpO2 (!) 86%       Supplemental O2  [x] Yes  [x] NO  Last bowel movement: unknown    Currently this patient has:  [x] Peripheral IV [] PICC  [] PORT [] ICD    [x] Rich Catheter [] NG Tube   [] PEG Tube    [] Rectal Tube [] Drain  [] Other:     Constitutional: awake, short of breath and agitated, opens eyes  Eyes: cl  ENMT: dry  Cardiovascular: HRR  Respiratory: sob with secretions   Gastrointestinal: snt bs+  Musculoskeletal:weakness noted with muscle wasting and cachexia  Skin: warm, dry  Neurologic: na  Psychiatric: agitated  Other:    Pertinent Lab and or Imaging Tests:  No results found for: NA, K, CL, CO2, AGAP, GLU, BUN, CREA, BUCR, GFRAA, GFRNA, CA, GFRAA  No results found for: TP, ALBR, TALB, ALB        Total time: 20 min  Counseling / coordination time: 10min--d/w hospice RN at [facility]  > 50% counseling / coordination?: y        This patient meets Hospice General Inpatient (GIP) Level of Care.     The precipitating event that resulted in the need for GIP was: ongoing uncontrolled agitation in the facility   because pt is not responding to oral medications for symptom  Relief, IV haldol and thorazine    Supporting documentation for GIP need for pain control:  [x] Frequent evaluation by a doctor, nurse practitioner, nurse   [x] Frequent medication adjustment    [x] IVs that cannot be administered at home   [x] Aggressive pain management   [] Complicated technical delivery of medications              Supporting documentation for GIP need for symptom control:  [x]  Sudden decline necessitating intensive nursing intervention  []  Uncontrolled / intractable nausea or vomiting   []  Pathological fractures  [x]  Advanced open wounds requiring frequent skilled care  [] Unmanageable respiratory distress  [x] New or worsening delirium   [x] Delirium with behavior issues  [] Imminent death - with skilled nursing needs documented above  Marilu Tao MD

## 2017-03-18 NOTE — PROGRESS NOTES
0700  Report received  0730  Pt lying in bed, eyes closed. No facial grimacing at this time. Lungs diminished but clear. No cough noted.  + bowel sounds. Last reported BM was 3-15. Rich is draining clement urine. Bilateral pitting edema in feet. Dressings on LE are dry and intact. 0900  Pt moaning with turning and repositioning during bath. Pt medicated with Scheduled meds early. 0920  Pt sleeping. No facial grimacing at this time. 1100  Pt continues to rest.  No facial grimacing at this time. 26  Pt's son arrived for a visit. Mr Marimar Larsen was arousable. 1357 pt complained of pain in his hips. Rn medicated pt with Haldol and  Dilaudid. Rn turned and repositioned pt. 26  Pt states he is better. 1600  Pt sleeping. Family has left. No complaints at this time. 1730  Pt medicated with scheduled meds. No facial grimacing or moaning at this time. 1845  Pt resting. No facial grimacing at this time. No dyspnea noted. 1900  Report given. NAME OF PATIENT:  Gisel Hernandez. LEVEL OF CARE:  GIP    REASON FOR GIP:   Pain, despite numerous changes in medications, Terminal agitation, despite changes to medications, Medication adjustment that must be monitored 24/7 and Stabilizing treatment that cannot take place at home    *PATIENT REMAINS ELIGIBLE FOR GIP LEVEL OF CARE AS EVIDENCED BY: (MUST BE ADDRESSED OF PATIENT GIP)  Pr received scheduled IV Haldol and Dilaudid.       O2 SAFETY:  Oxygen sign on the door    FALL INTERVENTIONS PROVIDED:   Implemented/recommended use of non-skid footwear, Implemented/recommended use of fall risk identification flag to all team members, Implemented/recommended assistive devices and encouraged their use, Implemented/recommended resources for alarm system (personal alarm, bed alarm, call bell, etc.) , Implemented/recommended environmental changes (remove hazards, lower bed, improve lighting, etc.) and Implemented/recommended increased supervision/assistance    INTERDISPLINARY COMMUNICATION/COLLABORATION:  Physician, MSW, Yocasta and RN, CNA    NEW MEDICATION INITIATION DOCUMENTATION:  No new medications initiated. COMFORTABLE DYING MEASURE:  Is Patient/family satisfied with symptom level?  yes    DISCHARGE PLAN:  Pt will remain at the [facility] until his symptoms are managed or his family makes alternative living arrangements.

## 2017-03-19 NOTE — PROGRESS NOTES
0700  Report received. 0720  Pt lying in bed, asleep. No facial grimacing or dyspnea noted. Lungs clear but diminished.  + bowel sounds. Last reported Bm was 3-15. Rich is draining cloudy clement urine. Feet with bilateral +2-3 edema. Pt did not arouse during assessment. Pt has numerous dressings on his lower ext. Dressing on left has visible drainage. Other dressings are intact. 0730  Tylenol supp given for temp of 101.7.  0740  Lorazepam 1mg given for agitation. Pt was moving his arms about and shaking the bed rails. 0800  Pt sleeping. 1008  Pt medicated with scheduled meds. Pt repositioned. Slight secretions noted. Pt did not wake up for breakfast. No facial grimacing or dyspnea noted. 1200  Pt turned and repositioned. No facial grimacing at this time. Respirations shallow and not labored. 1530  Pt agitated. Pulling at the covers. Pt medicated with LOrazepam Sl. Family at the bedside. 1700  Family wanting pt pt to be straightened up in bed. Rn repositioned pt. Family wanting to feed pt. Pt was not able to tolerate po intake. Pt is too weak and lethargic. 1830  Pt pulling at the covers and his IV site. No facial grimacing at this time. 1900  Report given. NAME OF PATIENT:  Benji Kern. LEVEL OF CARE:  [facility]    REASON FOR GIP:   Terminal agitation, despite changes to medications, Medication adjustment that must be monitored 24/7 and Stabilizing treatment that cannot take place at home    *PATIENT REMAINS ELIGIBLE FOR GIP LEVEL OF CARE AS EVIDENCED BY: (MUST BE ADDRESSED OF PATIENT GIP)  Pt continues to receive Iv medications.       O2 SAFETY:  RA  Oxygen sign on the door    FALL INTERVENTIONS PROVIDED:   Implemented/recommended use of fall risk identification flag to all team members, Implemented/recommended resources for alarm system (personal alarm, bed alarm, call bell, etc.)  and Implemented/recommended increased supervision/assistance    INTERDISPLINARY COMMUNICATION/COLLABORATION:  Physician, MSW, Yocasta and RN, CNA    NEW MEDICATION INITIATION DOCUMENTATION:  No new meds initiated       COMFORTABLE DYING MEASURE:  Is Patient/family satisfied with symptom level?  yes    DISCHARGE PLAN:  Pt will remain at the [facility] until he passes away or until his family makes alternative living arrangements.

## 2017-03-19 NOTE — PROGRESS NOTES
Verbal shift change report given to Billie Altman RN by Silver Hyman RN.  Report included the following information SBAR, Kardex, Intake/Output and MAR.       NAME OF PATIENT: Shahida Cano.      LEVEL OF CARE: GIP      REASON FOR GIP:   Terminal agitation, despite changes to medications, Medication adjustment that must be monitored 24/7, Stabilizing treatment that cannot take place at home and multiple wounds with frequent wound care.      PATIENT REMAINS ELIGIBLE FOR GIP LEVEL OF CARE AS EVIDENCED BY: Patient GIP for evaluation of severe agitation and pain which both are not well controlled at this time and for multiple advanced stage opened wounds.      REASON FOR RESPITE:  Patient not in [address] at this time.      O2 SAFETY: Patient on room air.       FALL INTERVENTIONS PROVIDED:   Implemented/recommended use of fall risk identification flag to all team members and Implemented/recommended resources for alarm system (personal alarm, bed alarm, call bell, etc.)       INTERDISPLINARY COMMUNICATION/COLLABORATION:  Physician, MSW, [place] and RN, CNA      NEW MEDICATION INITIATION DOCUMENTATION:  No new medications nor interventions begun on this night shift.       Reason medication is being initiated: N/A      MD / Provider name consulted re: change in status / initiation of new medication: N/A      New Symptom(s): N/A      New Order(s): N/A      Name of the person notified of the changes: N/A      Name of person being taught: N/A      Instructions given: N/A      Side Effects taught: N/A      Response to teaching: N/A      COMFORTABLE DYING MEASURE: Monitor for pain and agitation and intervene accordingly.       Is Patient/family satisfied with symptom level? yes      DISCHARGE PLAN: Patient to be discharged home under care of [facility] once symptoms can be effectively managed there.

## 2017-03-19 NOTE — PROGRESS NOTES
[address] [facility] Help to Those in Need  [phone]    Patient Name: Radhika Bertrand YOB: 1928    Date of Provider Hospice Visit: 03/19/17    Level of Care:   [x] General Inpatient (GIP)    [] Routine   [] Respite    Location of Care:  [] [facility] [] [facility] [] [address] [] [facility] [x] Hospice [facility]  [] Home [] Other:      Date of Original Hospice Admission: 3-16-17  Hospice Attending: Destiny Huitron MD    Principle Hospice Diagnosis: dementia with behavioral disturbances  Diagnoses RELATED to the terminal prognosis: multiple advanced staged open wounds  Other Diagnoses: pt is [place] and legally blind, emaciated and cachectic     HOSPICE NARRATIVE COMPOSED BY PHYSICIAN   Rationale for a prognosis of life expectancy of 6 months or less if the disease follows its normal course:    Radhika Bertrand is a 80y.o. year old who was admitted to [facility] from [facility] inpatient Palliative Medicine. 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 Functionally, the patient's Palliative Performance Scale has declined over a period of weeks and is estimated at 20%. Objective information that support this patients limited prognosis includes: aspiration sepsis, wounds, cachexia, anorexia, severe dementia, severe agitation. The patient/family chose comfort measures with the support of Hospice. HOSPICE DIAGNOSES   Active Symptoms:  1. Severe agitation requiring constant evaluation   2. Multiple advanced stage wounds  3. Generalized pain  4. Shortness of breath  5. Profound weakness  6. Cachexia with severe muscle wasting  7. Dysphagia      PLAN   1.  Admit pt to [facility] for evaluation of severe agitation and pain which both are not well controlled at this time and for multiple advanced stage opened wounds  2. Haldol IV 2 mg q 4 hrs scheduled, he has needed the scheduled dosing due to severe agitation when he is not medicated  3. Dilaudid IV 0.5mg q 4hrs and prior to dressing change, he has many non verbal pan indicatorsi  4. Oxygen 2l nc cont  5.  lorazepam if needed for increased agitation 1mg oral conc q 1 hr as needed, he is more calm now  6. Dulcolax and senna  7. Continue  wound care 2 x day for drainage and keep dry as much as possible  8. Allow comfort feeds even with dysphagia and high risk aspiration. Hold oral intake if lethargic -d/w RN  9. Oxygen for comfort    10.  and SW to support family needs  6. Disposition: home with family when symptoms are stable and he is managed by oral medications  12. All other comfort meds as needed  13. RN to notify family today if changes in patients mental status and low 02 sats. Not stable for transfer. Prognosis estimated based on 03/19/17 clinical assessment is:   [] Few to Many Hours  [x] Few to Many Days    [] Few to Many Weeks    [] Few to Many Months    Communicated plan of care with: Hospice Case Manager; Hospice IDT; Care Team     GOALS OF CARE     Resuscitation Status: DNR  Durable DNR: [x] Yes [] No    No flowsheet data found.      HISTORY     History obtained from: chart, staff nurse, physician    CHIEF COMPLAINT: pt is agitated  The patient is:   [x] Verbal  [] Nonverbal  [] Unresponsive    HPI/SUBJECTIVE: 80year old man with end stage dementia and agitation, multiple wounds and severe cachexia malnutrition, dysphagia and recent sepsis    02 sats down to 76%  Was still able to eat one meal yesterday per RN     REVIEW OF SYSTEMS     The following systems were: [] reviewed  [x] unable to be reviewed    Positive ROS include:  Constitutional:  Ears/nose/mouth/throat:  Respiratory:  Gastrointestinal:  Musculoskeletal:  Neurologic:  Psychiatric:  Endocrine:     Adult Non-Verbal Pain Assessment Score:7/10    Face  [] 0   No particular expression or smile  [] 1   Occasional grimace, tearing, frowning, wrinkled forehead  [x] 2   Frequent grimace, tearing, frowning, wrinkled forehead    Activity (movement)  [] 0   Lying quietly, normal position  [] 1   Seeking attention through movement or slow, cautious movement  [x] 2   Restless, excessive activity and/or withdrawal reflexes    Guarding  [] 0   Lying quietly, no positioning of hands over areas of body  [] 1   Splinting areas of the body, tense  [x] 2   Rigid, stiff    Physiology (vital signs)  [x] 0   Stable vital signs  [] 1   Change in any of the following: SBP > 20mm Hg; HR > 20/minute  [] 2   Change in any of the following: SBP > 30mm Hg; HR > 25/minute    Respiratory  [] 0   Baseline RR/SpO2, compliant with ventilator  [x] 1   RR > 10 above baseline, or 5% drop SpO2, mild asynchrony with ventilator  [] 2   RR > 20 above baseline, or 10% drop SpO2, asynchrony with ventilator     FUNCTIONAL ASSESSMENT     Palliative Performance Scale (PPS): 20%     PSYCHOSOCIAL/SPIRITUAL ASSESSMENT     Active Problems:    Agitation requiring sedation protocol (3/16/2017)      No past medical history on file. No past surgical history on file. Social History   Substance Use Topics    Smoking status: Not on file    Smokeless tobacco: Not on file    Alcohol use Not on file     No family history on file.    No Known Allergies   Current Facility-Administered Medications   Medication Dose Route Frequency    nystatin (MYCOSTATIN) 100,000 unit/gram powder   Topical TID    HYDROmorphone (PF) (DILAUDID) injection 0.5 mg  0.5 mg IntraVENous DAILY PRN    HYDROmorphone (PF) (DILAUDID) injection 0.5 mg  0.5 mg IntraVENous Q4H    acetaminophen (TYLENOL) suppository 650 mg  650 mg Rectal Q4H PRN    senna-docusate (PERICOLACE) 8.6-50 mg per tablet 2 Tab  2 Tab Oral BID PRN    bisacodyl (DULCOLAX) suppository 10 mg  10 mg Rectal DAILY PRN    ondansetron (ZOFRAN ODT) tablet 4 mg  4 mg Oral Q6H PRN    LORazepam (INTENSOL) 2 mg/mL oral concentrate 1 mg  1 mg Oral Q1H PRN    glycopyrrolate (ROBINUL) injection 0.2 mg  0.2 mg IntraVENous Q4H PRN    haloperidol lactate (HALDOL) injection 2 mg  2 mg IntraVENous Q4H        PHYSICAL EXAM     Wt Readings from Last 3 Encounters:   No data found for Wt       Visit Vitals    /73 (BP 1 Location: Right arm, BP Patient Position: Lying right side)    Pulse 75    Temp (!) 101.7 °F (38.7 °C)    Resp 18    SpO2 (!) 76%       Supplemental O2  [x] Yes  [x] NO  Last bowel movement: unknown    Currently this patient has:  [x] Peripheral IV [] PICC  [] PORT [] ICD    [x] Rich Catheter [] NG Tube   [] PEG Tube    [] Rectal Tube [] Drain  [] Other:     Constitutional : pt is lethargic, opens eyes only  Eyes: cl  ENMT: dry  Cardiovascular: HRR  Respiratory: sob with secretions   Gastrointestinal: snt bs+  Musculoskeletal:weakness noted with muscle wasting and cachexia  Skin: warm, dry  Neurologic: na  Psychiatric: agitated  Other:    Pertinent Lab and or Imaging Tests:  No results found for: NA, K, CL, CO2, AGAP, GLU, BUN, CREA, BUCR, GFRAA, GFRNA, CA, GFRAA  No results found for: TP, ALBR, TALB, ALB        Total time: 20 min  Counseling / coordination time: 10min--d/w hospice RN at [facility]  > 50% counseling / coordination?: y        This patient meets Hospice General Inpatient (GIP) Level of Care.     The precipitating event that resulted in the need for GIP was: ongoing uncontrolled agitation in the facility   because pt is not responding to oral medications for symptom  Relief, IV haldol and thorazine    Supporting documentation for GIP need for pain control:  [x] Frequent evaluation by a doctor, nurse practitioner, nurse   [x] Frequent medication adjustment    [x] IVs that cannot be administered at home   [x] Aggressive pain management   [] Complicated technical delivery of medications              Supporting documentation for GIP need for symptom control:  [x]  Sudden decline necessitating intensive nursing intervention  []  Uncontrolled / intractable nausea or vomiting   []  Pathological fractures  [x]  Advanced open wounds requiring frequent skilled care  [] Unmanageable respiratory distress  [x] New or worsening delirium   [x] Delirium with behavior issues  [] Imminent death - with skilled nursing needs documented above  Tamara Hilton MD

## 2017-03-20 NOTE — PROGRESS NOTES
0715:  Verbal shift change report given to Daxa Harmon RN (oncoming nurse) by Lazaro Ibrahim RN (offgoing nurse). Report included the following information SBAR, Kardex, Intake/Output and MAR.   0950:  Administered scheduled medications (see MAR); patient showing s/s of pain, agitation and audible secretions. Will administer prn medications. 1000:  Administered prn robinul, dilaudid, and ativan. Will reassess within 1 hour. 1100:  Reassessed; patient showing no s/s of pain nor agitation. LS have improved. 1200:  Rounded on patient; no s/s of distress. 1315:  Rounded on patient; patient sleeping. 1430:  Administered scheduled medications (see MAR); patient resting comfortably. 1600:  Completed wound care with the help of Glo Mcgarry CNA. After completion, patient showing s/s of pain. Will administer prn dilaudid. 1635:  Administered scheduled robinul and prn dilaudid for pain. Will continue to monitor. 1705:  Administered scheduled medications (see MAR); patient sleeping comfortably. 1830:  Rounded on patient; patient sleeping comfortably. Son was present and gave update on patient's condition. Son is very grateful the care his father is receiving here at [facility]. 1915:  Verbal shift change report given to Natalie Anaya RN (oncoming nurse) by Daxa Harmon RN (offgoing nurse). Report included the following information SBAR, Kardex, Intake/Output and MAR.

## 2017-03-20 NOTE — PROGRESS NOTES
Patient is an 80year old who was admitted to [facility] on 3-16-17 with dx of ES Dementia with severe disturbances. He is experiencing terminal agitation. Visit to room today found patient alone - he was in bed and appeared to be resting comfortably. No response to voice or light touch. Patient was admitted by Neelima Watts RN last evening - msw discussed her meeting with family. They want him to stay at the [facility] until he passes. If this isn't possible family would like nursing facility placement. Msw left 2 messages for son Robert Munoz requesting family meeting per Kevin Sosa NP. No response received. Msw's card left in room.   Will continue to follow

## 2017-03-20 NOTE — PROGRESS NOTES
1915  Report taken from previous shift, BRYCE Ibarra RN. Rounds made. No visitors present. 1930  Pt is 79yo male here for GIP. Hospice dx:  ES-Alzheimer's Disease. Pt is blind, [place], bedbound, contracted. Vocalizes but not oriented. Lungs coarse, congested cough, audible secretions. HRR, R-20, irregular breathing pattern. O2 sats 74% Abd soft, Rich draining cloudy urine, bowel sounds+. Skin pale, warm, multiple wound sites with dressings intact. Edema 2+ bilateral hands. Pedal pulses +. O2 applied at 4L NC.  1945  Vitals taken by Tech, cool packs placed for fever. Pt restless, jerking arms, calling out occasionally. 2030  PRN Tylenol supp AL. Wedding band removed for 2+ edema in fingers, hand. Called wife and let her know. 2110  P-IV left hand is infiltrated, restarted in left forearm #22.  2120  Dilaudid, Haldol IV given. PRN Robinul for congestion. Pt turned and repositioned, congested cough, audible secretions. Suctioning set up.  2200  Call to Dr. Dolores Rea. New Order: Scopolamine patch, PRN Dilaudid q1h for pain. 2230  Pt resting quietly, no signs of agitation. 2301  Scopolamine patch applied. Oral suctioned for secretions. 0045  Pt turned and repositioned, audible secretions continue. 0145  Dilaudid, Haldol and PRN Robinul for secretions, oral suctioned for copious tan secretions. Mouth care done. 0230  Pt resting quietly, no signs of agitation. 0400  Bed bath and shave. Pt restless and calling out. 0415  PRN Dilaudid 0.5mg IV given. Mouth care done. 0500  Pt resting quietly, med effective. Dsgs intact and dry. 0615  Haldol and Dilaudid IV given. Pt resting quietly, no signs of agitation or pain.  0700  Report given to oncoming shift. NAME OF PATIENT:  Gurvinder Russell.     LEVEL OF CARE:  GIP    REASON FOR GIP:   Pain, despite numerous changes in medications, Terminal agitation, despite changes to medications, Medication adjustment that must be monitored 24/7 and Stabilizing treatment that cannot take place at home    *PATIENT REMAINS ELIGIBLE FOR [facility] LEVEL OF CARE AS EVIDENCED BY:  Agitated, moving limbs, grabbing at sheets, vocalizations incoherent, grimacing, audible secretions, coarse lungs    FALL INTERVENTIONS PROVIDED:   Implemented/recommended use of fall risk identification flag to all team members and Implemented/recommended resources for alarm system (personal alarm, bed alarm, call bell, etc.)     INTERDISPLINARY COMMUNICATION/COLLABORATION:  Physician, MSW, [place] and RN, CNA    NEW MEDICATION INITIATION DOCUMENTATION:  PRN Dilaudid 0.5mg IV every 1 hour, Scopolamine patch    Reason medication is being initiated:  Secretions, increased nonverbal pain    MD / Provider name consulted re: change in status / initiation of new medication:  Dr. Shaunna Mcclelland    Name of the person notified of the changes:  Wife    Maryam Melton:  Is Patient/family satisfied with symptom level?  yes    DISCHARGE PLAN:  Return home when symptoms managed

## 2017-03-20 NOTE — PROGRESS NOTES
[facility] Initial Visit and Spiritual Assessment     I visited the room of this [facility] Patient who  is @ [facility] on GIP status. He was in bed, unresponsive, displaying a slightly irregular breath pattern. He was not agitated, not restless, and appeared well medicated and comfortable. With him was Terri Mccauley his grandson. I introduced myself, discussed my role as  within the care team and offered on-going pastoral care and spiritual support. Terri Mccauley noted that his grandfather had been a life long [employer] but as he had gotten older, neither he nor his wife who  years ago, Shelly Tellez, had been able to attend [employer]. The last [employer] which they attended was [employer]. I noted that I had been visiting the pt in the mornings to read Scripture and Psalms and he was very appreciative for that ministry. He asked me to continue to visit when I could and said he would let the rest of the family know of my availability. I gave him my contact information so he could contact me if he so desired. This afternoon, I provided a ministry of presence, active listening to Terri Mccauley, offered words of comfort, assurance, spiritual encouragement, as well as validated his emotions and normalized his grief. I also thanked him for his visit and care. GOALS:  Continue to visit this pt and his family to provide pastoral care and spiritual support to assist both the pt and them with coping with this decline. If they so desire, contact the [employer] to request Anointing. Continue to visit to build trust and familiarity with the family to encourage a discussion of their spiritual thoughts and concerns at a deeper and more personal level if they so choose. Validate the emotions and normalize the anticipatory grief of the family regarding this declining situation.    Offer written spiritual material to the family as needed or requested and provide a listening presence when needed. PLAN:   Continue to visit this pt and her family, while he is @ [facility] and I am in this facility, or PRN as requested. Coordinate with Adán Lorenzo as possible. Meet with additional family when/if possible  Coordinate with Care Team on POC.     VISIT FREQUENCY:   1 wk. 2 starting 3/20 plus 4 PRN 14 days.  [address] [facility], [address] [place] [address]  [phone]

## 2017-03-20 NOTE — PROGRESS NOTES
[facility]  [phone]    Patient Name: Gisel Angelo YOB: 1928    Date of Provider Hospice Visit: 03/20/17    Level of Care:   [x] General Inpatient (GIP)    [] Routine   [] Respite    Location of Care:  [] [facility] [] [facility] [] [facility] [] [facility] [x] [facility]osp[facility]  [] Home [] Other:      Date of Original Hospice Admission: 3-16-17  Hospice Attending: Lakesha Tang MD    Principle Hospice Diagnosis: Sepsis (gram negative)  Diagnoses RELATED to the terminal prognosis: multiple advanced staged open wounds,  dementia with behavioral disturbances  Other Diagnoses: pt is [place] and legally blind, emaciated and cachectic     HOSPICE NARRATIVE COMPOSED BY PHYSICIAN   Rationale for a prognosis of life expectancy of 6 months or less if the disease follows its normal course:    Gisel Angelo is a 80y.o. year old who was admitted to [facility] from [facility] inpatient Palliative Medicine. 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 Functionally, the patient's Palliative Performance Scale has declined over a period of weeks and is estimated at 20%. Objective information that support this patients limited prognosis includes: aspiration sepsis, wounds, cachexia, anorexia, severe dementia, severe agitation. The patient/family chose comfort measures with the support of Hospice. HOSPICE DIAGNOSES   Active Symptoms:  1. Severe agitation requiring constant evaluation   2. Multiple advanced stage wounds  3. Generalized pain: +++  4. Shortness of breath  5. Profound weakness  6. Cachexia with severe muscle wasting  7. Dysphagia   8.   Increased airway/chest secretions       PLAN 1. Continue GIP for evaluation of severe agitation and pain which both are not well controlled at this time and for multiple advanced stage opened wounds  2. Haldol IV 2 mg q 4 hrs scheduled, he has needed the scheduled dosing due to severe agitation when he is not medicated  3. Increase Dilaudid IV 1mg q 4hrs and prior to dressing change, he has many non verbal pan indicators  4. Increase dilaudid to 1mg IV q1h prn pain/restlessness  5. Schedule robinul 0.2mg IV q4h to help with secretions. 6. Oxygen 2l nc cont  7.  lorazepam if needed for increased agitation 1mg oral conc q 1 hr as needed, he is more calm now  8. Dulcolax and senna  9. Continue  wound care 2 x day for drainage and keep dry as much as possible  10. Allow comfort feeds even with dysphagia and high risk aspiration. Hold oral intake if lethargic -d/w RN  11. Oxygen for comfort    12.  and SW to support family needs: son coming in tomorrow for a family meeting to discuss and review goals and plan of care. Will meet with him along with Sw Ms. Francis  13. Disposition: home with family when symptoms are stable and he is managed by oral medications  14. All other comfort meds as needed  15. RN to notify family today if changes in patients mental status and low 02 sats. Not stable for transfer. Prognosis estimated based on 03/20/17 clinical assessment is:   [] Few to Many Hours  [x] Few to Many Days    [] Few to Many Weeks    [] Few to Many Months    Communicated plan of care with: Hospice Case Manager; Hospice IDT; Care Team     GOALS OF CARE     Resuscitation Status: DNR  Durable DNR: [x] Yes [] No    No flowsheet data found. HISTORY     History obtained from: chart, staff nurse, physician    CHIEF COMPLAINT: pt is agitated  The patient is:   [x] Verbal  [] Nonverbal  [] Unresponsive    HPI/SUBJECTIVE: remains unresponsive, appears to be agitated and has chest secretions, restless.    Required 2 doses of prn robinul, 2 dose of prn dilaudid, 1 dose of prn ativan. Needed all scheduled meds. REVIEW OF SYSTEMS     The following systems were: [] reviewed  [x] unable to be reviewed      Adult Non-Verbal Pain Assessment Score:6/10    Face  [] 0   No particular expression or smile  [x] 1   Occasional grimace, tearing, frowning, wrinkled forehead  [] 2   Frequent grimace, tearing, frowning, wrinkled forehead    Activity (movement)  [] 0   Lying quietly, normal position  [] 1   Seeking attention through movement or slow, cautious movement  [x] 2   Restless, excessive activity and/or withdrawal reflexes    Guarding  [] 0   Lying quietly, no positioning of hands over areas of body  [] 1   Splinting areas of the body, tense  [x] 2   Rigid, stiff    Physiology (vital signs)  [x] 0   Stable vital signs  [] 1   Change in any of the following: SBP > 20mm Hg; HR > 20/minute  [] 2   Change in any of the following: SBP > 30mm Hg; HR > 25/minute    Respiratory  [] 0   Baseline RR/SpO2, compliant with ventilator  [x] 1   RR > 10 above baseline, or 5% drop SpO2, mild asynchrony with ventilator  [] 2   RR > 20 above baseline, or 10% drop SpO2, asynchrony with ventilator     FUNCTIONAL ASSESSMENT     Palliative Performance Scale (PPS): 20%     PSYCHOSOCIAL/SPIRITUAL ASSESSMENT     Active Problems:    Agitation requiring sedation protocol (3/16/2017)      No past medical history on file. No past surgical history on file. Social History   Substance Use Topics    Smoking status: Not on file    Smokeless tobacco: Not on file    Alcohol use Not on file     No family history on file.    No Known Allergies   Current Facility-Administered Medications   Medication Dose Route Frequency    HYDROmorphone (PF) (DILAUDID) injection 1 mg  1 mg IntraVENous Q4H    glycopyrrolate (ROBINUL) injection 0.2 mg  0.2 mg IntraVENous Q4H    HYDROmorphone (PF) (DILAUDID) injection 1 mg  1 mg IntraVENous Q1H PRN    scopolamine (TRANSDERM-SCOP) 1.5 mg  1.5 mg TransDERmal Q72H  LORazepam (ATIVAN) injection 1 mg  1 mg IntraVENous Q1H PRN    nystatin (MYCOSTATIN) 100,000 unit/gram powder   Topical TID    acetaminophen (TYLENOL) suppository 650 mg  650 mg Rectal Q4H PRN    senna-docusate (PERICOLACE) 8.6-50 mg per tablet 2 Tab  2 Tab Oral BID PRN    bisacodyl (DULCOLAX) suppository 10 mg  10 mg Rectal DAILY PRN    ondansetron (ZOFRAN ODT) tablet 4 mg  4 mg Oral Q6H PRN    haloperidol lactate (HALDOL) injection 2 mg  2 mg IntraVENous Q4H        PHYSICAL EXAM     Wt Readings from Last 3 Encounters:   No data found for Wt       Visit Vitals    /67 (BP 1 Location: Left arm, BP Patient Position: At rest)    Pulse 79    Temp 99.4 °F (37.4 °C)    Resp 19    SpO2 (!) 80%       Supplemental O2  [x] Yes  [x] NO  Last bowel movement: unknown    Currently this patient has:  [x] Peripheral IV [] PICC  [] PORT [] ICD    [x] Rich Catheter [] NG Tube   [] PEG Tube    [] Rectal Tube [] Drain  [] Other:     Constitutional : pt is lethargic, but yet agitated and restless. Increased airway/chest secretions  Eyes: closed  ENMT:  Increased airway/chest secretions  Cardiovascular: HRR  Respiratory: sob with secretions   Gastrointestinal: snt bs+  Musculoskeletal:weakness noted with muscle wasting and cachexia  Skin: warm, dry  Neurologic: na  Psychiatric: agitated  Other:    Pertinent Lab and or Imaging Tests:  No results found for: NA, K, CL, CO2, AGAP, GLU, BUN, CREA, BUCR, GFRAA, GFRNA, CA, GFRAA  No results found for: TP, ALBR, TALB, ALB        Total time: 35 min  Counseling / coordination time: 10mts discussing with [facility] staff  > 50% counseling / coordination?: y        This patient meets Hospice General Inpatient (GIP) Level of Care.       Supporting documentation for GIP need for pain control:  [x] Frequent evaluation by a doctor, nurse practitioner, nurse   [x] Frequent medication adjustment    [x] IVs that cannot be administered at home   [x] Aggressive pain management   [] Complicated technical delivery of medications              Supporting documentation for GIP need for symptom control:  [x]  Sudden decline necessitating intensive nursing intervention  []  Uncontrolled / intractable nausea or vomiting   []  Pathological fractures  [x]  Advanced open wounds requiring frequent skilled care  [] Unmanageable respiratory distress  [x] New or worsening delirium   [x] Delirium with behavior issues  [] Imminent death - with skilled nursing needs documented above  Shady Navas MD

## 2017-03-20 NOTE — PROGRESS NOTES
NAME OF PATIENT:  Beena Vance. LEVEL OF CARE:  [facility]    REASON FOR GIP:   Pain, despite numerous changes in medications, Terminal agitation, despite changes to medications, Medication adjustment that must be monitored 24/7 and Stabilizing treatment that cannot take place at home    *PATIENT REMAINS ELIGIBLE FOR GIP LEVEL OF CARE AS EVIDENCED BY: (MUST BE ADDRESSED OF PATIENT GIP) Agitated, moving limbs, grabbing at sheets, vocalizations incoherent, grimacing, audible secretions, coarse lungs    REASON FOR RESPITE:  n/a    O2 SAFETY:  Concentrator positioning (6\" from furniture/drapes), Tanks stored in pop , No petroleum based products on face while oxygen in use and Oxygen sign on the door    FALL INTERVENTIONS PROVIDED:   Implemented/recommended use of fall risk identification flag to all team members, Implemented/recommended resources for alarm system (personal alarm, bed alarm, call bell, etc.) , Implemented/recommended environmental changes (remove hazards, lower bed, improve lighting, etc.) and Implemented/recommended increased supervision/assistance    INTERDISPLINARY COMMUNICATION/COLLABORATION:  Physician, MSW, [place] and RN, CNA    NEW MEDICATION INITIATION DOCUMENTATION:  n/a    Reason medication is being initiated:  n/a    MD / Provider name consulted re: change in status / initiation of new medication:  n/a    New Symptom(s):  n/a    New Order(s):  n/a    Name of the person notified of the changes:  n/a    Name of person being taught:  n/a    Instructions given:  n/a    Side Effects taught:  n/a    Response to teaching:  n/a      COMFORTABLE DYING MEASURE:  Is Patient/family satisfied with symptom level?  yes    DISCHARGE PLAN:  Once symptoms are managed, patient will go home with [facility] or possibly  here at [facility].

## 2017-03-21 PROBLEM — A41.50 SEPSIS DUE TO GRAM NEGATIVE BACTERIA (HCC): Status: ACTIVE | Noted: 2017-01-01

## 2017-03-21 NOTE — PROGRESS NOTES
0715:  Verbal shift change report given to Naldo Barlow RN (oncoming nurse) by Elton Crigler, RN (offgoing nurse). Report included the following information SBAR, Kardex, Intake/Output and MAR.   0830:  Assessed patient (see Simple Assessment) and administered scheduled medications (see MAR); CNA in room taking VS; no radial pulse nor pedal pulses, BP 60/38 (weak), O2 stat at 89% @ 3L; no s/s of distress. Will contact family of patient's condition. 0900:  Administered scheduled medications (see MAR); no s/s of distress. 0920:  Call RUDY Alston's wife, to notify family that patient is declining; she will contact the rest of the family. 1615:  Administered scheduled robinul; patient's RR 22/labored. Administered prn dilaudid 1mg/IV. 1740:  Administered scheduled medications; family present; patient shows no s/s of distress. 1915:  Verbal shift change report given to Mirian Chen RN (oncoming nurse) by Naldo Barlow RN (offgoing nurse). Report included the following information SBAR, Kardex, Intake/Output and MAR.

## 2017-03-21 NOTE — PROGRESS NOTES
[facility]  [phone]    Patient Name: Manju Otto. YOB: 1928    Date of Provider Hospice Visit: 03/21/17    Level of Care:   [x] General Inpatient (GIP)    [] Routine   [] Respite    Location of Care:  [] [facility] [] [facility] [] [facility] [] [facility] [x] Hospice [facility]  [] Home [] Other:      Date of Original Hospice Admission: 3-16-17  Hospice Attending: Sarah Moseley MD    Principle Hospice Diagnosis: Sepsis (gram negative)  Diagnoses RELATED to the terminal prognosis: multiple advanced staged open wounds,  dementia with behavioral disturbances  Other Diagnoses: pt is [place] and legally blind, emaciated and cachectic     HOSPICE NARRATIVE COMPOSED BY PHYSICIAN   Rationale for a prognosis of life expectancy of 6 months or less if the disease follows its normal course:    Manju Birmingham is a 80y.o. year old who was admitted to [facility] from [facility]. 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 Functionally, the patient's Palliative Performance Scale has declined over a period of weeks and is estimated at 20%. Objective information that support this patients limited prognosis includes: aspiration sepsis, wounds, cachexia, anorexia, severe dementia, severe agitation. The patient/family chose comfort measures with the support of Hospice. HOSPICE DIAGNOSES   Active Symptoms:  1. Severe agitation requiring constant evaluation: much less  2. Multiple advanced stage wounds  3. Generalized pain: +++  4. Shortness of breath  5. Profound weakness  6. Cachexia with severe muscle wasting  7. Dysphagia   8.   Increased airway/chest secretions: worse ++       PLAN   1. Continue GIP for evaluation of severe agitation and pain which both are not well controlled at this time and for multiple advanced stage opened wounds  2. Change Haldol IV 2 mg q 3 hrs scheduled, he has needed the scheduled dosing due to severe agitation when he is not medicated  3. Increase Dilaudid IV 1mg q 3hrs and prior to dressing change, he has many non verbal pan indicators  4. Change dilaudid to 1mg IV q15 mts prn pain/restlessness  5. Schedule robinul 0.2mg IV q4h to help with secretions. 6. Add robinul 0.2mg Iv q2h prn secretions  7. Oxygen 2l nc cont  8.  lorazepam if needed for increased agitation 1mg oral conc q 1 hr as needed, he is more calm now  9. Dulcolax and senna  10. Continue  wound care 2 x day for drainage and keep dry as much as possible  11. Allow comfort feeds even with dysphagia and high risk aspiration. Hold oral intake if lethargic -d/w RN  12. Oxygen for comfort    13.  and SW to support family needs: met with whole family; wife, 3 sons, daughter, grandchildren: provided reassurance and support; family grieving appropriately. 14. Disposition: home with family when symptoms are stable and he is managed by oral medications: unlikely as pt close to dying. Prognosis estimated based on 03/21/17 clinical assessment is:   [x] Few to Many Hours  [] Few to Many Days    [] Few to Many Weeks    [] Few to Many Months    Communicated plan of care with: Hospice Case Manager; Hospice IDT; Care Team     GOALS OF CARE     Resuscitation Status: DNR  Durable DNR: [x] Yes [] No    No flowsheet data found.      HISTORY     History obtained from: chart, staff nurse, family    CHIEF COMPLAINT: pt is agitated  The patient is:   [x] Verbal  [] Nonverbal  [] Unresponsive    HPI/SUBJECTIVE: remains unresponsive, appears to be somewhat restless, increased chest secretions, moaning and other non verbal cues for pain  Required 1 dose of prn dilaudid & Needed all scheduled meds.      REVIEW OF SYSTEMS     The following systems were: [] reviewed  [x] unable to be reviewed      Adult Non-Verbal Pain Assessment Score:6/10    Face  [] 0   No particular expression or smile  [x] 1   Occasional grimace, tearing, frowning, wrinkled forehead  [] 2   Frequent grimace, tearing, frowning, wrinkled forehead    Activity (movement)  [] 0   Lying quietly, normal position  [x] 1   Seeking attention through movement or slow, cautious movement  [] 2   Restless, excessive activity and/or withdrawal reflexes    Guarding  [] 0   Lying quietly, no positioning of hands over areas of body  [] 1   Splinting areas of the body, tense  [x] 2   Rigid, stiff    Physiology (vital signs)  [] 0   Stable vital signs  [x] 1   Change in any of the following: SBP > 20mm Hg; HR > 20/minute  [] 2   Change in any of the following: SBP > 30mm Hg; HR > 25/minute    Respiratory  [] 0   Baseline RR/SpO2, compliant with ventilator  [x] 1   RR > 10 above baseline, or 5% drop SpO2, mild asynchrony with ventilator  [] 2   RR > 20 above baseline, or 10% drop SpO2, asynchrony with ventilator     FUNCTIONAL ASSESSMENT     Palliative Performance Scale (PPS): 10%     PSYCHOSOCIAL/SPIRITUAL ASSESSMENT     Active Problems:    Agitation requiring sedation protocol (3/16/2017)      No past medical history on file. No past surgical history on file. Social History   Substance Use Topics    Smoking status: Not on file    Smokeless tobacco: Not on file    Alcohol use Not on file     No family history on file.    No Known Allergies   Current Facility-Administered Medications   Medication Dose Route Frequency    scopolamine (TRANSDERM-SCOP) 3 mg  3 mg TransDERmal Q72H    HYDROmorphone (PF) (DILAUDID) injection 1 mg  1 mg IntraVENous Q3H    haloperidol lactate (HALDOL) injection 2 mg  2 mg IntraVENous Q3H    HYDROmorphone (PF) (DILAUDID) injection 1 mg  1 mg IntraVENous Q15MIN PRN    glycopyrrolate (ROBINUL) injection 0.1 mg  0.1 mg IntraVENous Q2H PRN    glycopyrrolate (ROBINUL) injection 0.2 mg  0.2 mg IntraVENous Q4H    LORazepam (ATIVAN) injection 1 mg  1 mg IntraVENous Q1H PRN    nystatin (MYCOSTATIN) 100,000 unit/gram powder   Topical TID    acetaminophen (TYLENOL) suppository 650 mg  650 mg Rectal Q4H PRN    senna-docusate (PERICOLACE) 8.6-50 mg per tablet 2 Tab  2 Tab Oral BID PRN    bisacodyl (DULCOLAX) suppository 10 mg  10 mg Rectal DAILY PRN    ondansetron (ZOFRAN ODT) tablet 4 mg  4 mg Oral Q6H PRN        PHYSICAL EXAM     Wt Readings from Last 3 Encounters:   No data found for Wt       Visit Vitals    BP (!) 80/44 (BP 1 Location: Left leg, BP Patient Position: Lying right side)    Pulse 74    Temp 98.5 °F (36.9 °C)    Resp 16    SpO2 (!) 89%       Supplemental O2  [x] Yes  [x] NO  Last bowel movement: unknown    Currently this patient has:  [x] Peripheral IV [] PICC  [] PORT [] ICD    [x] Rich Catheter [] NG Tube   [] PEG Tube    [] Rectal Tube [] Drain  [] Other:     Constitutional : pt is lethargic, but yet restless. Increased airway/chest secretions, audible moaning  Eyes: closed  ENMT:  Increased airway/chest secretions  Cardiovascular: HRR  Respiratory: sob with secretions   Gastrointestinal: snt bs+  Musculoskeletal:weakness noted with muscle wasting and cachexia  Skin: warm, dry  Neurologic: na  Psychiatric: agitated  Other:    Pertinent Lab and or Imaging Tests:  No results found for: NA, K, CL, CO2, AGAP, GLU, BUN, CREA, BUCR, GFRAA, GFRNA, CA, GFRAA  No results found for: TP, ALBR, TALB, ALB        Total time: 35 min  Counseling / coordination time: 20mts discussing with [facility] staff & family  > 50% counseling / coordination?: y        This patient meets Hospice General Inpatient (GIP) Level of Care.       Supporting documentation for GIP need for pain control:  [x] Frequent evaluation by a doctor, nurse practitioner, nurse   [x] Frequent medication adjustment    [x] IVs that cannot be administered at home   [x] Aggressive pain management   [] Complicated technical delivery of medications              Supporting documentation for GIP need for symptom control:  [x]  Sudden decline necessitating intensive nursing intervention  []  Uncontrolled / intractable nausea or vomiting   []  Pathological fractures  [x]  Advanced open wounds requiring frequent skilled care  [] Unmanageable respiratory distress  [x] New or worsening delirium   [x] Delirium with behavior issues  [] Imminent death - with skilled nursing needs documented above  Manas Marin MD

## 2017-03-21 NOTE — [facility] IDG CHAPLAIN NOTES
Patient: Bre Pearson. Date: 17  Time: 5:28 PM  SPIRITUAL ISSUES:  The pt is unresponsive and appears to be moving toward eol. His large family is at his bedside supporting each other. Pt is Confucianist, his wife Katiuska Griggs; neither active in Sabianism at this time but were quite active when first . Family is very active in [employer] and use fanta to copy. The [employer] zamora from [address] [place] [address] has been requested to perform an Anointing of the sick. GRIEF:  His family display anticipatory grief but are accepting as this has been a slow decline with several ups and downs. COPING:  Pt and family appeared stressed by situation, but are coping appropriately. :   plans are complete  AVAILABLE SPIRITUAL RESOURCES:  Continue the use of a generalized [place] fanta-based counseling and spiritual encouragement approach with the pt and family and suggest the reliance upon both internal and external spiritual resources. Provide written resources as needed or requested. GOALS:  Continue to visit this pt and his family, to provide a ministry of present, familiarity, and [place] focused Spiritual Care and Support to assist both all with coping with his decline. Build trust and familiarity with the family. Validate the emotions and normalize the anticipatory grief of the family regarding this declining situation. Offer written spiritual material to the family as needed or requested and provide a listening presence if requested. PLAN:   Continue to visit this pt and his family, while he is @ [facility] and I am in this facility, or PRN as requested. Meet with additional family when/if possible  Coordinate with Care Team on POC.          Signed by: Jacinto Dorado

## 2017-03-21 NOTE — PROGRESS NOTES
Visit with Dr. Bertha De Souza today to see patient - he was lying quietly in bed - unresponsive. No family in room. Msw has been trying to contact son Rachel Alex to scheduled family meeting but calls haven't been returned. Msw was able to locate a number for Izaiah's wife - called and she stated she would fax her  with msw's number. Received telephone call back and was able to schedule a meeting for tomorrow at 2p.   Will continue to follow

## 2017-03-21 NOTE — PROGRESS NOTES
NAME OF PATIENT:  Landis Goldberg. LEVEL OF CARE:  GIP    REASON FOR GIP:   Pain, despite numerous changes in medications, Terminal agitation, despite changes to medications and symptom management of pain, agitation, multiple wounds, and secretions. *PATIENT REMAINS ELIGIBLE FOR GIP LEVEL OF CARE AS EVIDENCED BY: (MUST BE ADDRESSED OF PATIENT GIP)  Per Dr. Vicki Bernal, remains unresponsive, appears to be agitated and has chest secretions, restless. Required 2 doses of prn robinul, 2 dose of prn dilaudid, 1 dose of prn ativan. Needed all scheduled meds. REASON FOR RESPITE:  n/a    O2 SAFETY:  Concentrator positioning (6\" from furniture/drapes), No petroleum based products on face while oxygen in use and Oxygen sign on the door    FALL INTERVENTIONS PROVIDED:   n/a    INTERDISPLINARY COMMUNICATION/COLLABORATION:  Physician, MSW, [place] and RN, CNA    NEW MEDICATION INITIATION DOCUMENTATION:  No new medications at this time. Reason medication is being initiated:  None    MD / Provider name consulted re: change in status / initiation of new medication:  n/a    New Symptom(s):  No new symptoms at this time. New Order(s):  None    Name of the person notified of the changes:  n/a    Name of person being taught:  n/a    Instructions given:  n/a    Side Effects taught:  n/a    Response to teaching:  n/a      COMFORTABLE DYING MEASURE:  Is Patient/family satisfied with symptom level?  yes    DISCHARGE PLAN:  None at this time.

## 2017-03-21 NOTE — PROGRESS NOTES
[address] Initial Visit and Spiritual Assessment      I visited the room of this [facility] HSPTL Patient who is @ [facility] on GIP status. He was in bed, unresponsive, displaying an irregular breath pattern. He was not agitated, not restless, and appeared well medicated and comfortable. With him was his large family, including Guerita Figueredo, his wife, several son and daughter in laws, and family friends. I introduced myself, discussed my role as  within the care team and offered on-going pastoral care and spiritual support. The family had requested the pt receive anointing and I informed the family that the Storm zamora at [address] [place] [address] had been called. The last [facility] which he and Guerita Figueredo  attended was [employer]. However, Guerita Figueredo was and is still [place] [employer] among all these Catholics\". I noted that Kaz Moran yesterday. They very appreciative for my ministry. They asked me to continue to visit when I could. I gave him my contact information to the son, Ruthy Garcia, and noted they could contact me if he so desired. This afternoon, I provided a ministry of presence, active listening to various family members, offered words of comfort, assurance, spiritual encouragement, as well as offered prayer and a blessing GOALS:  Continue to visit this pt and his family to provide pastoral care and spiritual support to assist both the pt and them with coping with this decline. If they so desire, contact the [place] to request Anointing. Continue to visit to build trust and familiarity with the family to encourage a discussion of their spiritual thoughts and concerns at a deeper and more personal level if they so choose. Validate the emotions and normalize the anticipatory grief of the family regarding this declining situation. Offer written spiritual material to the family as needed or requested and provide a listening presence when needed.    PLAN: Continue to visit this pt and her family, while he is @ [facility] and I am in this facility, or PRN as requested. Coordinate with Miranda Sotelo as possible. Meet with additional family when/if possible  Coordinate with Care Team on POC.     VISIT FREQUENCY:   1 wk. 2 starting 3/20 plus 4 PRN 14 days.  Florance Rubinstein, [address]  [phone]

## 2017-03-21 NOTE — PROGRESS NOTES
NAME OF PATIENT:  Beena Vance. LEVEL OF CARE:  [facility]    REASON FOR GIP:   Pain, despite numerous changes in medications, Unmanageable respiratory distress, Terminal agitation, despite changes to medications, Medication adjustment that must be monitored 24/7 and Stabilizing treatment that cannot take place at home    *PATIENT REMAINS ELIGIBLE FOR GIP LEVEL OF CARE AS EVIDENCED BY: (MUST BE ADDRESSED OF PATIENT GIP) Continue GIP for evaluation of severe agitation and pain which both are not well controlled at this time and for multiple advanced stage opened wounds. REASON FOR RESPITE:  n/a    O2 SAFETY:  Concentrator positioning (6\" from furniture/drapes), Tanks stored in pop , No petroleum based products on face while oxygen in use and Oxygen sign on the door    FALL INTERVENTIONS PROVIDED:   Implemented/recommended resources for alarm system (personal alarm, bed alarm, call bell, etc.) , Implemented/recommended environmental changes (remove hazards, lower bed, improve lighting, etc.) and Implemented/recommended increased supervision/assistance    INTERDISPLINARY COMMUNICATION/COLLABORATION:  Physician, MSW, [place] and RN, CNA    NEW MEDICATION INITIATION DOCUMENTATION:  n/a    Reason medication is being initiated:  n/a    MD / Provider name consulted re: change in status / initiation of new medication:  n/a    New Symptom(s):  n/a    New Order(s):  n/a    Name of the person notified of the changes:  n/a    Name of person being taught:  n/a    Instructions given:  n/a    Side Effects taught:  n/a    Response to teaching:  n/a      COMFORTABLE DYING MEASURE:  Is Patient/family satisfied with symptom level?  yes    DISCHARGE PLAN:  Patient is declining and will  her at [facility].

## 2017-03-21 NOTE — PROGRESS NOTES
Visit with Kelin Cochran to see patient today - he seemed comfortable/peaceful - unresponsive. The grandparents of his fiance Mandy Auguste present - they visit daily. Very fond of patient. They had nothing but wonderful things to say about him. Msw to continue to provide emotional support and ongoing assessment.

## 2017-03-21 NOTE — PROGRESS NOTES
Verbal shift change report given to Neil Lu (oncoming nurse) by Kenzie Chirinos (offgoing nurse). Report included the following information SBAR and Kardex. 2655 - Received report from Kenzie Chirinos. 2000 - Resting in bed. Quiet. Rich is patent and draining cloudy yellow urine. 2242 - Scheduled Dilaudid 1 mg IV given. Scheduled Nystatin powder to groin area. Groin area is pink, intact. Tylenol 650 mg suppository given for fever. 2050 - Turned and repositioned to right side. 2055 - Scheduled Robinul 0.2mg IV given. No redness, swelling, or redness at left antecubital peripheral IV site. 2238 - Scheduled Haldol 2 mg IV given. Scheduled Dilaudid 1 mg IV given. 2244 - Fever remains 101.4 after being treated with lowering the temperature in the room and turning on the ceiling fan. Tylenol suppository given. Resting quietly, occasional moaning, occasional congested cough. Scheduled Nystatin powder applied to groin area. 0050 - Scheduled Robinul 0.2mg IV given. 7339 - Turned, repositioned. Some grimacing.  0226 - Scheduled Haldol 2 mg IV given. Scheduled Dilaudid 1 mg IV given. 0230 - Slightly restless. Breathing is even and unlabored. 0300 - Bath given by Curlene Meals. 0354 - Scheduled Robinul 0.2mg IV given. Dilaudid 1 mg PRN dose given for restlessness and moaning.  0359 - Axillary temperature = 99.7. Increasing audible secretions and restlessness. 7630 - Scheduled Haldol 2 mg IV given. Scheduled Dilaudid 1 mg IV given.

## 2017-03-22 NOTE — PROGRESS NOTES
[address] [place] [address] [facility]  [phone]    Patient Name: Earl Guzman YOB: 1928    Date of Provider Hospice Visit: 03/22/17    Level of Care:   [x] General Inpatient (GIP)    [] Routine   [] Respite    Location of Care:  [] [facility] [] [facility] [] [facility] [] [facility] [x] Hospice [facility]  [] Home [] Other:      Date of Original Hospice Admission: 3-16-17  Hospice Attending: Destiny Saucedo MD    Principle Hospice Diagnosis: Sepsis (gram negative)  Diagnoses RELATED to the terminal prognosis: multiple advanced staged open wounds,  dementia with behavioral disturbances  Other Diagnoses: pt is [place] and legally blind, emaciated and cachectic     HOSPICE NARRATIVE COMPOSED BY PHYSICIAN   Rationale for a prognosis of life expectancy of 6 months or less if the disease follows its normal course:    Earl Guzman is a 80y.o. year old who was admitted to [facility] from [facility] inpatient Palliative Medicine. 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 Functionally, the patient's Palliative Performance Scale has declined over a period of weeks and is estimated at 20%. Objective information that support this patients limited prognosis includes: aspiration sepsis, wounds, cachexia, anorexia, severe dementia, severe agitation. The patient/family chose comfort measures with the support of Hospice. HOSPICE DIAGNOSES   Active Symptoms:  1. Severe agitation requiring constant evaluation: much less  2. Multiple advanced stage wounds  3. Generalized pain: +++  4. Shortness of breath  5. Profound weakness  6. Cachexia with severe muscle wasting  7. Dysphagia   8.   Increased airway/chest secretions: worse ++       PLAN   1. Continue GIP for evaluation of severe agitation and pain which both are not well controlled at this time and for multiple advanced stage opened wounds  2. Change Haldol IV 2 mg q 3 hrs scheduled, he has needed the scheduled dosing due to severe agitation when he is not medicated  3. Increase Dilaudid IV 1.5mg q 3hrs and prior to dressing change, he has many non verbal pan indicators  4. Change dilaudid to 2mg IV q15 mts prn pain/restlessness  5. Schedule robinul 0.2mg IV q6h to help with secretions. 6. Add robinul 0.2mg Iv q2h prn secretions  7. Oxygen 2l nc cont  8.  lorazepam if needed for increased agitation 1mg oral conc q 1 hr as needed, he is more calm now  9. Dulcolax and senna  10. Continue  wound care 2 x day for drainage and keep dry as much as possible  11. Allow comfort feeds even with dysphagia and high risk aspiration. Hold oral intake if lethargic -d/w RN  12. Oxygen for comfort    13.  and SW to support family needs: met with whole family; wife, 3 sons, daughter, grandchildren: provided reassurance and support; family grieving appropriately. 14. Disposition: home with family when symptoms are stable and he is managed by oral medications: unlikely as pt close to dying. Prognosis estimated based on 03/22/17 clinical assessment is:   [x] Few to Many Hours  [] Few to Many Days    [] Few to Many Weeks    [] Few to Many Months    Communicated plan of care with: Hospice Case Manager; Hospice IDT; Care Team     GOALS OF CARE     Resuscitation Status: DNR  Durable DNR: [x] Yes [] No    No flowsheet data found. HISTORY     History obtained from: chart, staff nurse, family    CHIEF COMPLAINT:N/A  The patient is:   [] Verbal  [x] Nonverbal  [x] Unresponsive    HPI/SUBJECTIVE: remains unresponsive, appears little restless and agitated  Requiring all scheduled meds regularly.       REVIEW OF SYSTEMS     The following systems were: [] reviewed  [x] unable to be reviewed      Adult Non-Verbal Pain Assessment Score:6/10    Face  [] 0   No particular expression or smile  [x] 1   Occasional grimace, tearing, frowning, wrinkled forehead  [] 2   Frequent grimace, tearing, frowning, wrinkled forehead    Activity (movement)  [] 0   Lying quietly, normal position  [x] 1   Seeking attention through movement or slow, cautious movement  [] 2   Restless, excessive activity and/or withdrawal reflexes    Guarding  [] 0   Lying quietly, no positioning of hands over areas of body  [] 1   Splinting areas of the body, tense  [x] 2   Rigid, stiff    Physiology (vital signs)  [] 0   Stable vital signs  [x] 1   Change in any of the following: SBP > 20mm Hg; HR > 20/minute  [] 2   Change in any of the following: SBP > 30mm Hg; HR > 25/minute    Respiratory  [] 0   Baseline RR/SpO2, compliant with ventilator  [x] 1   RR > 10 above baseline, or 5% drop SpO2, mild asynchrony with ventilator  [] 2   RR > 20 above baseline, or 10% drop SpO2, asynchrony with ventilator     FUNCTIONAL ASSESSMENT     Palliative Performance Scale (PPS): 10%     PSYCHOSOCIAL/SPIRITUAL ASSESSMENT     Principal Problem:    Sepsis due to Gram negative bacteria (HCC) (3/21/2017)    Active Problems:    Agitation requiring sedation protocol (3/16/2017)      No past medical history on file. No past surgical history on file. Social History   Substance Use Topics    Smoking status: Not on file    Smokeless tobacco: Not on file    Alcohol use Not on file     No family history on file.    No Known Allergies   Current Facility-Administered Medications   Medication Dose Route Frequency    glycopyrrolate (ROBINUL) injection 0.2 mg  0.2 mg IntraVENous Q6H    HYDROmorphone (PF) (DILAUDID) injection 1.5 mg  1.5 mg IntraVENous Q3H    glycopyrrolate (ROBINUL) injection 0.2 mg  0.2 mg IntraVENous Q2H PRN    HYDROmorphone (PF) (DILAUDID) injection 2 mg  2 mg IntraVENous Q15MIN PRN    scopolamine (TRANSDERM-SCOP) 3 mg  3 mg TransDERmal Q72H    haloperidol lactate (HALDOL) injection 2 mg  2 mg IntraVENous Q3H    glycopyrrolate (ROBINUL) injection 0.2 mg  0.2 mg IntraVENous Q4H    LORazepam (ATIVAN) injection 1 mg  1 mg IntraVENous Q1H PRN    nystatin (MYCOSTATIN) 100,000 unit/gram powder   Topical TID    acetaminophen (TYLENOL) suppository 650 mg  650 mg Rectal Q4H PRN    senna-docusate (PERICOLACE) 8.6-50 mg per tablet 2 Tab  2 Tab Oral BID PRN    bisacodyl (DULCOLAX) suppository 10 mg  10 mg Rectal DAILY PRN    ondansetron (ZOFRAN ODT) tablet 4 mg  4 mg Oral Q6H PRN        PHYSICAL EXAM     Wt Readings from Last 3 Encounters:   No data found for Wt       Visit Vitals    BP 94/60    Pulse 90    Temp 98.3 °F (36.8 °C)    Resp 20    SpO2 (!) 80%       Supplemental O2  [x] Yes  [x] NO  Last bowel movement: unknown    Currently this patient has:  [x] Peripheral IV [] PICC  [] PORT [] ICD    [x] Rich Catheter [] NG Tube   [] PEG Tube    [] Rectal Tube [] Drain  [] Other:     Constitutional : pt is lethargic, but yet restless. Increased airway/chest secretions, audible moaning  Eyes: closed  ENMT:  Increased airway/chest secretions  Cardiovascular: HRR  Respiratory: sob with secretions   Gastrointestinal: snt bs+  Musculoskeletal:weakness noted with muscle wasting and cachexia  Skin: warm, dry  Neurologic: na  Psychiatric: agitated  Other:    Pertinent Lab and or Imaging Tests:  No results found for: NA, K, CL, CO2, AGAP, GLU, BUN, CREA, BUCR, GFRAA, GFRNA, CA, GFRAA  No results found for: TP, ALBR, TALB, ALB        Total time: 35 min  Counseling / coordination time: 15 mts discussing with [facility] staff and family  > 50% counseling / coordination?: y        This patient meets Hospice General Inpatient (GIP) Level of Care.       Supporting documentation for GIP need for pain control:  [x] Frequent evaluation by a doctor, nurse practitioner, nurse   [x] Frequent medication adjustment    [x] IVs that cannot be administered at home [x] Aggressive pain management   [] Complicated technical delivery of medications              Supporting documentation for GIP need for symptom control:  [x]  Sudden decline necessitating intensive nursing intervention  []  Uncontrolled / intractable nausea or vomiting   []  Pathological fractures  [x]  Advanced open wounds requiring frequent skilled care  [] Unmanageable respiratory distress  [x] New or worsening delirium   [x] Delirium with behavior issues  [] Imminent death - with skilled nursing needs documented above  Dimitri Mobley MD

## 2017-03-22 NOTE — PROGRESS NOTES
Louise Ordonez 139 report from [facility] pt is actively dying    2000 Assessed lethargic no purposeful movement, PIV and aclle patent    2200 Pt tolerates turns and mouth care, allowing time for questions from family    2300 pericare and Nystatin applied    0600 Night shift summary - lethargic, hypotensive, actively dying, pain and agitation well controlled, no prns needed  Tolerates turning. Family remains at bedside.     0700 Report to oncoming shift      NAME OF PATIENT: Benji Johnson     LEVEL OF CARE: GIP     REASON FOR GIP:   Pain, despite numerous changes in medications, Unmanageable respiratory distress, Terminal agitation, despite changes to medications, Medication adjustment that must be monitored 24/7 and Stabilizing treatment that cannot take place at home     *PATIENT REMAINS ELIGIBLE FOR GIP LEVEL OF CARE AS EVIDENCED BY: (MUST BE ADDRESSED OF PATIENT GIP) Continue GIP for evaluation of severe agitation and pain which both are not well controlled at this time and for multiple advanced stage opened wounds.      REASON FOR RESPITE:  n/a     O2 SAFETY:  Concentrator positioning (6\" from furniture/drapes), Tanks stored in pop , No petroleum based products on face while oxygen in use and Oxygen sign on the door     FALL INTERVENTIONS PROVIDED:   Implemented/recommended resources for alarm system (personal alarm, bed alarm, call bell, etc.) , Implemented/recommended environmental changes (remove hazards, lower bed, improve lighting, etc.) and Implemented/recommended increased supervision/assistance     INTERDISPLINARY COMMUNICATION/COLLABORATION:  Physician, MSW, [place] and RN, CNA     NEW MEDICATION INITIATION DOCUMENTATION:  n/a     Reason medication is being initiated: n/a     MD / Provider name consulted re: change in status / initiation of new medication: n/a     New Symptom(s): n/a     New Order(s): n/a     Name of the person notified of the changes: n/a     Name of person being taught: n/a     Instructions given: n/a     Side Effects taught: n/a     Response to teaching: n/a        COMFORTABLE DYING MEASURE:  Is Patient/family satisfied with symptom level? yes     DISCHARGE PLAN: Patient is declining and will  her at [facility].

## 2017-03-22 NOTE — PROGRESS NOTES
NAME OF PATIENT:  Mai Lee. LEVEL OF CARE:  [facility]    REASON FOR GIP:   Medication adjustment that must be monitored 24/7 and Stabilizing treatment that cannot take place at home    *PATIENT REMAINS ELIGIBLE FOR [facility] LEVEL OF CARE AS EVIDENCED BY: (MUST BE ADDRESSED OF PATIENT GIP)      REASON FOR RESPITE:  N/A    O2 SAFETY:  Concentrator positioning (6\" from furniture/drapes), Tanks stored in pop , No petroleum based products on face while oxygen in use and Oxygen sign on the door    FALL INTERVENTIONS PROVIDED:   Implemented/recommended use of non-skid footwear, Implemented/recommended use of fall risk identification flag to all team members, Implemented/recommended assistive devices and encouraged their use, Implemented/recommended resources for alarm system (personal alarm, bed alarm, call bell, etc.) , Implemented/recommended environmental changes (remove hazards, lower bed, improve lighting, etc.) and Implemented/recommended increased supervision/assistance    INTERDISPLINARY COMMUNICATION/COLLABORATION:  Physician, MSW, [place] and RN, CNA    NEW MEDICATION INITIATION DOCUMENTATION:  Documentation completed in Clinical Note in [facility]    Reason medication is being initiated:  N/A    MD / Provider name consulted re: change in status / initiation of new medication:  N/A    New Symptom(s):  N/A    New Order(s):  Increased dilaudid and scheduled robinul    Name of the person notified of the changes:  Timoteo Elizondo RN    Name of person being taught:  N/A    Instructions given:  N/A    Side Effects taught:  N/A    Response to teaching:  N/A      COMFORTABLE DYING MEASURE:  Is Patient/family satisfied with symptom level?  yes    DISCHARGE PLAN:  EOL care; patient will  at [facility].      0700: Care assumed of patient. Report from [employer]. Patient in room sleeping. Family also asleep in the room (one on sofa and one in the chair).  Patient is [facility] level of care with a diagnosis of end stage alzheimer's with symptom of pain, secretions, and wound care that cannot be treated at home. Patient also requires wound care. Patient is unresponsive but does moan at times. Patient is blind and [place] but does not have a hearing aide. Rich in place with minimal UOP. Respirations even and clear to auscultation. S1/S2 normal. Last BM 3-15-17. Patient has multiple advanced wounds located: Stage IV left hip, Stage I to LLE, Stage I to left heel, Stage II to right hip, and Stage II to left side of back requiring dressing changes and monitoring. Patient also has noted edema to bilateral hands and feet. Patient is total care and is on bedrest. Remains NPO. Will continue to closely monitor. 9117: Patient medicated with scheduled meds at this time. Tolerated well via PIV Left AC. Flushed with 10ml NS before and after for line patency. Son at bedside. 1030: No change in patient status. Will continue to monitor. Family at the bedside. 1220: No change in status. Family remains at the bedside. 1303: Patient medicated with scheduled medications. 1453: No change in status. Will continue to monitor. 1720: Patient medicated with scheduled meds and repositioned in bed with assistance of CNA. Pt family stepped out for dinner. 1850: Awaiting shift change. No change in pt status.

## 2017-03-22 NOTE — HSPC IDG NURSE NOTES
Patient: Snow Momin.     Date: 03/22/17  Time: 5:37 AM    [facility] Nurse Notes        SELVIN COM HSPTL  Patient Name - Gian Kovacs  Episode -   Benefit Period-  Date of IDT Meeting  3/23/17  UPDATED COMPREHENSIVE ASSESSMENT    Neuro lethargic no purposeful movement actively dying  Resp - mild scattered rhonchi room air  GI/ incontinent, calle to BSD, last BM 3/15  Heart tachy  Skin - multiple wounds  Family - holding bedside martin  ATTENDING Physician        Dr Trina Florez BP unobtainable 100.5-100-18 Ox sat 82%    LAB VALUES (when available)    KARNOFSKY 20%    FAST for all dementia 7F    Progression to DEPENDENCE WITH ADLs (include time frame)  Total care patient    PRESSURE ULCERS  DTI left hip and multiple skin tears    DISEASE SPECIFIC Alzheimers    FALL RISK:small    PROBLEM: Pain  GOAL:Pain free  INTERVENTIONS(INDIVIDUALIZED) Medicate prior to turning and wound care    Nursing Visit Frequency  Hospice Aide Visit Frequency      Signed by: Inés Beach RN

## 2017-03-23 NOTE — PROGRESS NOTES
0700 Received report from [facility], RN. Pt received in bed, grimaces to touch , family members at the bedside. 0900 Pt repositioned to left side. Grimaces with movement. Scheduled Dilaudid given for management of pain. Drsgs to wounds dry and intact. [address] at the bedside  1145 Pt repositioned to right side. Facial grimace with movement. Scheduled meds to manage pain. 1400 Family at the bedside. 1600 Pt repositioned . Facial grimace with movement. Scheduled Dilaudid given to manage pain. 1800 Pt appears in comfort when not moved. Grimaces with movement. NAME OF PATIENT:  Princess Mcqueen.     LEVEL OF CARE:  GIP    REASON FOR GIP:   Pain, despite numerous changes in medications, Terminal agitation, despite changes to medications, Medication adjustment that must be monitored 24/7 and Stabilizing treatment that cannot take place at home    *PATIENT REMAINS ELIGIBLE FOR GIP LEVEL OF CARE AS EVIDENCED BY: (MUST BE ADDRESSED OF PATIENT GIP)      REASON FOR RESPITE:  na    O2 SAFETY:  Concentrator positioning (6\" from furniture/drapes), Tanks stored in pop , No petroleum based products on face while oxygen in use and Oxygen sign on the door    FALL INTERVENTIONS PROVIDED:   Implemented/recommended use of fall risk identification flag to all team members, Implemented/recommended resources for alarm system (personal alarm, bed alarm, call bell, etc.) , Implemented/recommended environmental changes (remove hazards, lower bed, improve lighting, etc.) and Implemented/recommended increased supervision/assistance    INTERDISPLINARY COMMUNICATION/COLLABORATION:  Physician, GABE, Yocasta and RN, CNA    NEW MEDICATION INITIATION DOCUMENTATION:  na    Reason medication is being initiated:    Na      MD / Provider name consulted re: change in status / initiation of new medication:  na            New Symptom(s):  na    New Order(s):  na    Name of the person notified of the changes:  na    Name of person being taught:  na  Instructions given:      Side Effects taught:  na    Response to teaching:  na      COMFORTABLE DYING MEASURE:  Is Patient/family satisfied with symptom level? yes    DISCHARGE PLAN:  Remain at [facility] until symptoms are managed or end of life

## 2017-03-23 NOTE — PROGRESS NOTES
[facility] Help to Those in Need  [phone]    Patient Name: Alan Macario. YOB: 1928    Date of Provider Hospice Visit: 03/23/17    Level of Care:   [x] General Inpatient (GIP)    [] Routine   [] Respite    Location of Care:  [] [facility] [place] [] [facility] [] [facility] [] [facility] [x] Hospice [facility]  [] Home [] Other:      Date of Original Hospice Admission: 3-16-17  Hospice Attending: Denise Garcia MD    Principle Hospice Diagnosis: Sepsis (gram negative)  Diagnoses RELATED to the terminal prognosis: multiple advanced staged open wounds,  dementia with behavioral disturbances  Other Diagnoses: pt is [place] and legally blind, emaciated and cachectic     HOSPICE NARRATIVE COMPOSED BY PHYSICIAN   Rationale for a prognosis of life expectancy of 6 months or less if the disease follows its normal course:    Alan Galeana is a 80y.o. year old who was admitted to [facility] from [facility] inpatient Palliative Medicine. 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 Functionally, the patient's Palliative Performance Scale has declined over a period of weeks and is estimated at 20%. Objective information that support this patients limited prognosis includes: aspiration sepsis, wounds, cachexia, anorexia, severe dementia, severe agitation. The patient/family chose comfort measures with the support of Hospice. HOSPICE DIAGNOSES   Active Symptoms:  1. Severe agitation requiring constant evaluation: much less  2. Multiple advanced stage wounds  3. Generalized pain: ++  4. Shortness of breath  5. Profound weakness  6. Cachexia with severe muscle wasting  7. Dysphagia   8.   Increased airway/chest secretions:+       PLAN   1. Continue GIP for evaluation of severe agitation and pain which both are not well controlled at this time and for multiple advanced stage opened wounds  2. Continue Haldol IV 2 mg q 3 hrs scheduled, he has needed the scheduled dosing due to severe agitation when he is not medicated  3. Change dilaudid to 2mg IV q3h scheduled and q15 mts prn pain/restlessness  4. Schedule robinul 0.2mg IV q4h to help with secretions. 5. Add robinul 0.2mg Iv q2h prn secretions  6. Oxygen 2l nc cont  7.  lorazepam if needed for increased agitation 1mg oral conc q 1 hr as needed, he is more calm now  8. Dulcolax and senna  9. Continue  wound care 2 x day for drainage and keep dry as much as possible  10. Oxygen for comfort    11.  and SW to support family needs: son at bedside, family visiting regularly. 12. Disposition: home with family when symptoms are stable and he is managed by oral medications: unlikely as pt close to dying. Prognosis estimated based on 03/23/17 clinical assessment is:   [x] Few to Many Hours  [] Few to Many Days    [] Few to Many Weeks    [] Few to Many Months    Communicated plan of care with: Hospice Case Manager; Hospice IDT; Care Team     GOALS OF CARE     Resuscitation Status: DNR  Durable DNR: [x] Yes [] No    No flowsheet data found. HISTORY     History obtained from: chart, staff nurse, family    CHIEF COMPLAINT:N/A  The patient is:   [] Verbal  [x] Nonverbal  [x] Unresponsive    HPI/SUBJECTIVE: remains unresponsive  Requiring all scheduled meds regularly.       REVIEW OF SYSTEMS     The following systems were: [] reviewed  [x] unable to be reviewed      Adult Non-Verbal Pain Assessment Score:5/10    Face  [] 0   No particular expression or smile  [x] 1   Occasional grimace, tearing, frowning, wrinkled forehead  [] 2   Frequent grimace, tearing, frowning, wrinkled forehead    Activity (movement)  [] 0   Lying quietly, normal position  [x] 1   Seeking attention through movement or slow, cautious movement  [] 2   Restless, excessive activity and/or withdrawal reflexes    Guarding  [] 0   Lying quietly, no positioning of hands over areas of body  [] 1   Splinting areas of the body, tense  [x] 2   Rigid, stiff    Physiology (vital signs)  [x] 0   Stable vital signs  [] 1   Change in any of the following: SBP > 20mm Hg; HR > 20/minute  [] 2   Change in any of the following: SBP > 30mm Hg; HR > 25/minute    Respiratory  [] 0   Baseline RR/SpO2, compliant with ventilator  [x] 1   RR > 10 above baseline, or 5% drop SpO2, mild asynchrony with ventilator  [] 2   RR > 20 above baseline, or 10% drop SpO2, asynchrony with ventilator     FUNCTIONAL ASSESSMENT     Palliative Performance Scale (PPS): 10%     PSYCHOSOCIAL/SPIRITUAL ASSESSMENT     Principal Problem:    Sepsis due to Gram negative bacteria (HCC) (3/21/2017)    Active Problems:    Agitation requiring sedation protocol (3/16/2017)      No past medical history on file. No past surgical history on file. Social History   Substance Use Topics    Smoking status: Not on file    Smokeless tobacco: Not on file    Alcohol use Not on file     No family history on file.    No Known Allergies   Current Facility-Administered Medications   Medication Dose Route Frequency    HYDROmorphone (PF) (DILAUDID) injection 1.5 mg  1.5 mg IntraVENous Q3H    glycopyrrolate (ROBINUL) injection 0.2 mg  0.2 mg IntraVENous Q2H PRN    HYDROmorphone (PF) (DILAUDID) injection 2 mg  2 mg IntraVENous Q15MIN PRN    glycopyrrolate (ROBINUL) injection 0.2 mg  0.2 mg IntraVENous Q6H    scopolamine (TRANSDERM-SCOP) 3 mg  3 mg TransDERmal Q72H    haloperidol lactate (HALDOL) injection 2 mg  2 mg IntraVENous Q3H    LORazepam (ATIVAN) injection 1 mg  1 mg IntraVENous Q1H PRN    nystatin (MYCOSTATIN) 100,000 unit/gram powder   Topical TID    acetaminophen (TYLENOL) suppository 650 mg  650 mg Rectal Q4H PRN    senna-docusate (PERICOLACE) 8.6-50 mg per tablet 2 Tab  2 Tab Oral BID PRN    bisacodyl (DULCOLAX) suppository 10 mg  10 mg Rectal DAILY PRN    ondansetron (ZOFRAN ODT) tablet 4 mg  4 mg Oral Q6H PRN        PHYSICAL EXAM     Wt Readings from Last 3 Encounters:   No data found for Wt       Visit Vitals    /62    Pulse 77    Temp 97.2 °F (36.2 °C)    Resp 18    SpO2 (!) 84%       Supplemental O2  [x] Yes  [x] NO  Last bowel movement: unknown    Currently this patient has:  [x] Peripheral IV [] PICC  [] PORT [] ICD    [x] Rich Catheter [] NG Tube   [] PEG Tube    [] Rectal Tube [] Drain  [] Other:     Constitutional : pt is lethargic. Still some airway/chest secretions, some moaning  Eyes: closed  ENMT:  Increased airway/chest secretions  Cardiovascular: HRR  Respiratory: sob with secretions   Gastrointestinal: snt bs+  Musculoskeletal:weakness noted with muscle wasting and cachexia  Skin: warm, dry  Neurologic: na  Psychiatric: restless intermittently  Other:    Pertinent Lab and or Imaging Tests:  No results found for: NA, K, CL, CO2, AGAP, GLU, BUN, CREA, BUCR, GFRAA, GFRNA, CA, GFRAA  No results found for: TP, ALBR, TALB, ALB        Total time: 35 min  Counseling / coordination time: 15 mts discussing with [facility] staff and family  > 50% counseling / coordination?: y        This patient meets Hospice General Inpatient (GIP) Level of Care.       Supporting documentation for GIP need for pain control:  [x] Frequent evaluation by a doctor, nurse practitioner, nurse   [x] Frequent medication adjustment    [x] IVs that cannot be administered at home   [x] Aggressive pain management   [] Complicated technical delivery of medications              Supporting documentation for GIP need for symptom control:  [x]  Sudden decline necessitating intensive nursing intervention  []  Uncontrolled / intractable nausea or vomiting   []  Pathological fractures  [x]  Advanced open wounds requiring frequent skilled care  [] Unmanageable respiratory distress  [x] New or worsening delirium   [x] Delirium with behavior issues  [] Imminent death - with skilled nursing needs documented above  Amaury Marcum MD

## 2017-03-23 NOTE — PROGRESS NOTES
NAME OF PATIENT:  Vinny Nicholson. LEVEL OF CARE:  RESPITE    REASON FOR GIP:   Pain, despite numerous changes in medications, Medication adjustment that must be monitored 24/7 and Stabilizing treatment that cannot take place at home    *PATIENT REMAINS ELIGIBLE FOR GIP LEVEL OF CARE AS EVIDENCED BY: (MUST BE ADDRESSED OF PATIENT GIP)      REASON FOR RESPITE:  NA    O2 SAFETY:  NA    FALL INTERVENTIONS PROVIDED:   Implemented/recommended use of fall risk identification flag to all team members and Implemented/recommended environmental changes (remove hazards, lower bed, improve lighting, etc.)    INTERDISPLINARY COMMUNICATION/COLLABORATION:  Physician, MSW, [place] and RN, CNA    NEW MEDICATION INITIATION DOCUMENTATION:  NA    Reason medication is being initiated:  NA    MD / Provider name consulted re: change in status / initiation of new medication:  NA    New Symptom(s):  NA    New Order(s):  NA    Name of the person notified of the changes:  NA    Name of person being taught:  NA    Instructions given:  NA    Side Effects taught:  NA    Response to teaching:  NA      COMFORTABLE DYING MEASURE:  Is Patient/family satisfied with symptom level?  yes    DISCHARGE PLAN:  Remain at [facility] until end of life. 19:00 Report received from [facility], patient lying in bed eyes closed, unresponsive, shallow breathing, lung sounds diminished. Skin warm to touch, 2t edema in lower/upper extremities, multiple wounds dsg d/i. Rich cath patent and draining celment urine, last bm 3/15/17. No signs of pain or resp distress noted. . Family at bedside. 20:30 Medicated with yadi medications, resting quietly, appears comfortable. Family remains at bedside. 22:30 Turned and repositioned for comfort, mouth care provided, shave provided by [facility] CNA.  01:30 Patient resting quietly, no signs of pain or respiratory distress noted, appears comfortable. 02:30 Medicated with yadi meds, resting quietly.  Family remains at bedside. 04:00 Patient resting quietly no signs of pain or resp distress noted. 06:00 Medicated with yadi meds, appears comfortable. Family remains at bedside.

## 2017-03-24 NOTE — PROGRESS NOTES
NAME OF PATIENT:  Princess Mcqueen. LEVEL OF CARE:  [facility]    REASON FOR GIP:   Medication adjustment that must be monitored 24/ and Stabilizing treatment that cannot take place at home    *PATIENT REMAINS ELIGIBLE FOR [facility] LEVEL OF CARE AS EVIDENCED BY: (MUST BE ADDRESSED OF PATIENT GIP)      REASON FOR RESPITE:  GIP level of care    O2 SAFETY:  n/a    FALL INTERVENTIONS PROVIDED:   Implemented/recommended use of non-skid footwear, Implemented/recommended use of fall risk identification flag to all team members, Implemented/recommended assistive devices and encouraged their use, Implemented/recommended resources for alarm system (personal alarm, bed alarm, call bell, etc.) , Implemented/recommended environmental changes (remove hazards, lower bed, improve lighting, etc.) and Implemented/recommended increased supervision/assistance    INTERDISPLINARY COMMUNICATION/COLLABORATION:  Physician, MSW, [place] and RN, CNA    NEW MEDICATION INITIATION DOCUMENTATION:  Documentation completed in Clinical Note in [facility]    Reason medication is being initiated:  N/A    MD / Provider name consulted re: change in status / initiation of new medication:  N/A    New Symptom(s):  N/A    New Order(s):  N/A    Name of the person notified of the changes:  N/A    Name of person being taught:  N/A    Instructions given:  N/A    Side Effects taught:  N/A    Response to teaching:  N/A      COMFORTABLE DYING MEASURE:  Is Patient/family satisfied with symptom level?  yes    DISCHARGE PLAN:  Patient most likely to  at [facility].      0700: Report received from FEI Santos to assume patient care. Patient remains GIP level of care for End Stage Dementia/Alzheimers and complex wound care. Patient is unresponsive but does moan from time to time when turning. Family remains at the bedside and are very supportive. Patient dressings are clean, dry, and intact. Breath sounds diminished with normal S1/S2. Last BM 3-15-17.  Patient has noted edema to bilateral hands and feet. Patient requires total care and on bedrest and is NPO. Will continue to closely monitor. 7342: Patient medicated with scheduled medications via PIV. Son remains at the bedside. Will continue to closely monitor patient progress. 1116: Patient medicated with scheduled medications via PIV. Patient repositioned at this time and tolerated moderately well. Dressings remain clean, dry, and intact. Will continue to monitor. Both sons are at the bedside. 1255: No change in patient status. Will continue to monitor patient progress. 1314: Patient medicated with scheduled meds. 1540: No change in patient status. Family in room. Patient repositioned at this time. 1707: Patient medicated with scheduled medications. Family in room and patient remains in no distress. Awaiting shift change.

## 2017-03-24 NOTE — PROGRESS NOTES
[address] [place] [address] Help to Those in Need  [phone]    Patient Name: Gisel Angelo YOB: 1928    Date of Provider Hospice Visit: 03/24/17    Level of Care:   [x] General Inpatient (GIP)    [] Routine   [] Respite    Location of Care:  [] [facility] [] [facility] [] [facility] [] [facility] [x] Hospice [facility]  [] Home [] Other:      Date of Original Hospice Admission: 3-16-17  Hospice Attending: Lakesha Tang MD    Principle Hospice Diagnosis: Sepsis (gram negative)  Diagnoses RELATED to the terminal prognosis: multiple advanced staged open wounds,  dementia with behavioral disturbances  Other Diagnoses: pt is [place] and legally blind, emaciated and cachectic     HOSPICE NARRATIVE COMPOSED BY PHYSICIAN   Rationale for a prognosis of life expectancy of 6 months or less if the disease follows its normal course:    Gisel Angelo is a 80y.o. year old who was admitted to [facility] from [facility] inpatient Palliative Medicine. 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 Functionally, the patient's Palliative Performance Scale has declined over a period of weeks and is estimated at 20%. Objective information that support this patients limited prognosis includes: aspiration sepsis, wounds, cachexia, anorexia, severe dementia, severe agitation. The patient/family chose comfort measures with the support of Hospice. HOSPICE DIAGNOSES   Active Symptoms:  1. Severe agitation requiring constant evaluation: much less  2. Multiple advanced stage wounds  3. Generalized pain: ++  4. Shortness of breath  5. Profound weakness  6. Cachexia with severe muscle wasting  7. Dysphagia   8.   Increased airway/chest secretions:+       PLAN   1. Continue GIP for evaluation of agitation & pain which both are not well controlled at this time and for multiple advanced stage opened wounds. 2. Continue Haldol IV 2 mg q 3 hrs scheduled, he has needed the scheduled dosing due to severe agitation when he is not medicated  3. Continue dilaudid to 2mg IV q3h scheduled and q15 mts prn pain/restlessness  4. Continue scheduled robinul 0.2mg IV q4h to help with secretions & robinul 0.2mg Iv q2h prn secretions  5. Oxygen 2l nc cont  6. Lorazepam 1mg IV q 1 hr as needed for agitation, he is more calm now  7. Dulcolax and senna  8. Continue  wound care 2 x day for drainage and keep dry as much as possible  9. Oxygen for comfort    10.  and SW to support family needs: son at bedside, family visiting regularly. 11. Disposition: home with family when symptoms are stable and he is managed by oral medications: unlikely as pt close to dying. Prognosis estimated based on 03/24/17 clinical assessment is:   [x] Few to Many Hours  [] Few to Many Days    [] Few to Many Weeks    [] Few to Many Months    Communicated plan of care with: Hospice Case Manager; Hospice IDT; Care Team     GOALS OF CARE     Resuscitation Status: DNR  Durable DNR: [x] Yes [] No    No flowsheet data found. HISTORY     History obtained from: chart, staff nurse, family    CHIEF COMPLAINT:N/A  The patient is:   [] Verbal  [x] Nonverbal  [x] Unresponsive    HPI/SUBJECTIVE: remains unresponsive  Requiring all scheduled meds regularly.       REVIEW OF SYSTEMS     The following systems were: [] reviewed  [x] unable to be reviewed      Adult Non-Verbal Pain Assessment Score:5/10    Face  [] 0   No particular expression or smile  [x] 1   Occasional grimace, tearing, frowning, wrinkled forehead  [] 2   Frequent grimace, tearing, frowning, wrinkled forehead    Activity (movement)  [] 0   Lying quietly, normal position  [x] 1   Seeking attention through movement or slow, cautious movement  [] 2   Restless, excessive activity and/or withdrawal reflexes    Guarding  [] 0   Lying quietly, no positioning of hands over areas of body  [] 1   Splinting areas of the body, tense  [x] 2   Rigid, stiff    Physiology (vital signs)  [x] 0   Stable vital signs  [] 1   Change in any of the following: SBP > 20mm Hg; HR > 20/minute  [] 2   Change in any of the following: SBP > 30mm Hg; HR > 25/minute    Respiratory  [] 0   Baseline RR/SpO2, compliant with ventilator  [x] 1   RR > 10 above baseline, or 5% drop SpO2, mild asynchrony with ventilator  [] 2   RR > 20 above baseline, or 10% drop SpO2, asynchrony with ventilator     FUNCTIONAL ASSESSMENT     Palliative Performance Scale (PPS): 10%     PSYCHOSOCIAL/SPIRITUAL ASSESSMENT     Principal Problem:    Sepsis due to Gram negative bacteria ([facility] [address] (3/21/2017)    Active Problems:    Agitation requiring sedation protocol (3/16/2017)      No past medical history on file. No past surgical history on file. Social History   Substance Use Topics    Smoking status: Not on file    Smokeless tobacco: Not on file    Alcohol use Not on file     No family history on file.    No Known Allergies   Current Facility-Administered Medications   Medication Dose Route Frequency    HYDROmorphone (PF) (DILAUDID) injection 2 mg  2 mg IntraVENous Q3H    glycopyrrolate (ROBINUL) injection 0.2 mg  0.2 mg IntraVENous Q4H    glycopyrrolate (ROBINUL) injection 0.2 mg  0.2 mg IntraVENous Q2H PRN    HYDROmorphone (PF) (DILAUDID) injection 2 mg  2 mg IntraVENous Q15MIN PRN    scopolamine (TRANSDERM-SCOP) 3 mg  3 mg TransDERmal Q72H    haloperidol lactate (HALDOL) injection 2 mg  2 mg IntraVENous Q3H    LORazepam (ATIVAN) injection 1 mg  1 mg IntraVENous Q1H PRN    nystatin (MYCOSTATIN) 100,000 unit/gram powder   Topical TID    acetaminophen (TYLENOL) suppository 650 mg  650 mg Rectal Q4H PRN    senna-docusate (PERICOLACE) 8.6-50 mg per tablet 2 Tab  2 Tab Oral BID PRN  bisacodyl (DULCOLAX) suppository 10 mg  10 mg Rectal DAILY PRN    ondansetron (ZOFRAN ODT) tablet 4 mg  4 mg Oral Q6H PRN        PHYSICAL EXAM     Wt Readings from Last 3 Encounters:   No data found for Wt       Visit Vitals    /68    Pulse 67    Temp 99.7 °F (37.6 °C)    Resp 26    SpO2 (!) 87%       Supplemental O2  [x] Yes  [x] NO  Last bowel movement: unknown    Currently this patient has:  [x] Peripheral IV [] PICC  [] PORT [] ICD    [x] Rich Catheter [] NG Tube   [] PEG Tube    [] Rectal Tube [] Drain  [] Other:     Constitutional : pt is lethargic, opens eyes intermittently for brief moment,  Some airway/chest secretions, some moaning  Eyes: closed  ENMT:  Some airway/chest secretions  Cardiovascular: HRR  Respiratory: sob with secretions   Gastrointestinal: snt bs+  Musculoskeletal:weakness noted with muscle wasting and cachexia  Skin: warm, dry  Neurologic: na  Psychiatric: restless intermittently  Other:    Pertinent Lab and or Imaging Tests:  No results found for: NA, K, CL, CO2, AGAP, GLU, BUN, CREA, BUCR, GFRAA, GFRNA, CA, GFRAA  No results found for: TP, ALBR, TALB, ALB        Total time: 35 min  Counseling / coordination time: 15 mts discussing with [facility] staff and family  > 50% counseling / coordination?: y        This patient meets Hospice General Inpatient (GIP) Level of Care.       Supporting documentation for GIP need for pain control:  [x] Frequent evaluation by a doctor, nurse practitioner, nurse   [x] Frequent medication adjustment    [x] IVs that cannot be administered at home   [x] Aggressive pain management   [] Complicated technical delivery of medications              Supporting documentation for GIP need for symptom control:  [x]  Sudden decline necessitating intensive nursing intervention  []  Uncontrolled / intractable nausea or vomiting   []  Pathological fractures  [x]  Advanced open wounds requiring frequent skilled care  [] Unmanageable respiratory distress  [x] New or worsening delirium   [x] Delirium with behavior issues  [] Imminent death - with skilled nursing needs documented above  Destiny Huitron MD

## 2017-03-24 NOTE — PROGRESS NOTES
NAME OF PATIENT:  Landis Goldberg. LEVEL OF CARE:  GIP  REASON FOR GIP:   Pain, despite numerous changes in medications, Terminal agitation, despite changes to medications, Medication adjustment that must be monitored 24/7 and Stabilizing treatment that cannot take place at home    *PATIENT REMAINS ELIGIBLE FOR GIP LEVEL OF CARE AS EVIDENCED BY: (MUST BE ADDRESSED OF PATIENT GIP)      REASON FOR RESPITE:  NA    O2 SAFETY:  Concentrator positioning (6\" from furniture/drapes), Tanks stored in pop , No petroleum based products on face while oxygen in use and Oxygen sign on the door    FALL INTERVENTIONS PROVIDED:   Implemented/recommended environmental changes (remove hazards, lower bed, improve lighting, etc.) and Implemented/recommended increased supervision/assistance    INTERDISPLINARY COMMUNICATION/COLLABORATION:  Physician, MSW, [place] and RN, CNA    NEW MEDICATION INITIATION DOCUMENTATION:  NA    Reason medication is being initiated:  NA    MD / Provider name consulted re: change in status / initiation of new medication:  NA    New Symptom(s):  NA    New Order(s): NA    Name of the person notified of the changes: NA    Name of person being taught:  NA    Instructions given:  NA    Side Effects taught:  NA    Response to teaching:  NA      COMFORTABLE DYING MEASURE:  Is Patient/family satisfied with symptom level?  yes    DISCHARGE PLAN:  Remain at [facility] until end of life. 19:15 Report received from [facility], patient lying in bed, eyes closed unresponsive, shallow breathing with periods of apnea. No signs of pain or respiratory distress noted, appears comfortable. Family at bedside. 20:30 Medicated with yadi meds, turned and repositioned for comfort. Family at bedside. 23:30 Patient resting quietly, no signs of pain noted, appears comfortable. 03:00 Medicated with yadi meds, turned and repositioned for comfort, mouth care provided.   04:00 Complete bed bath and bed change provided by Joelle Stark ramin Fish. Resting quietly.

## 2017-03-24 NOTE — PROGRESS NOTES
Joint visit with Dr. Haleigh Blanco to see patient today. Met with many family members who were in the room. They were engaging and seemed to enjoy telling their favorite stories about patient. .  Patient unresponsive - appeared to be comfortable/peaceful. Family expressed that was there belief and were very appreciative of the support and care received at the [facility]. Will continue to follow with emotional support.

## 2017-03-25 NOTE — PROGRESS NOTES
Louise Ordonez 139 report from [facility]      2000 Assessed lethargic no purposeful movement, PIV and calle patent     2200 Pt tolerates turns and mouth care, allowing time for questions from family     2300 pericare and Nystatin applied     0600 Night shift summary - lethargic, hypotensive,  pain and agitation well controlled, one dose ativan given prn for bath  Tolerates turning. Family remains at bedside.     0630 Wound care done tolerated well     0700 Report to oncoming shift        NAME OF PATIENT: Radhika Paredes.      LEVEL OF CARE: GIP      REASON FOR GIP:   Pain, despite numerous changes in medications, Unmanageable respiratory distress, Terminal agitation, despite changes to medications, Medication adjustment that must be monitored 24/7 and Stabilizing treatment that cannot take place at home      *PATIENT REMAINS ELIGIBLE FOR GIP LEVEL OF CARE AS EVIDENCED BY: (MUST BE ADDRESSED OF PATIENT GIP) Continue GIP for evaluation of severe agitation and pain which both are not well controlled at this time and for multiple advanced stage opened wounds.       REASON FOR RESPITE:  n/a      O2 SAFETY:  Concentrator positioning (6\" from furniture/drapes), Tanks stored in pop , No petroleum based products on face while oxygen in use and Oxygen sign on the door      FALL INTERVENTIONS PROVIDED:   Implemented/recommended resources for alarm system (personal alarm, bed alarm, call bell, etc.) , Implemented/recommended environmental changes (remove hazards, lower bed, improve lighting, etc.) and Implemented/recommended increased supervision/assistance      INTERDISPLINARY COMMUNICATION/COLLABORATION:  Physician, MSW, [place] and RN, CNA      NEW MEDICATION INITIATION DOCUMENTATION:  n/a      Reason medication is being initiated: n/a      MD / Provider name consulted re: change in status / initiation of new medication: n/a      New Symptom(s): n/a      New Order(s): n/a      Name of the person notified of the changes: n/a      Name of person being taught: n/a      Instructions given: n/a      Side Effects taught: n/a      Response to teaching: n/a          COMFORTABLE DYING MEASURE:  Is Patient/family satisfied with symptom level? yes      DISCHARGE PLAN: Patient is declining and will  her at [facility].          Revision History

## 2017-03-25 NOTE — PROGRESS NOTES
[facility] Help to Those in Need  [phone]    Patient Name: Snow Qureshi YOB: 1928    Date of Provider Hospice Visit: 03/25/17    Level of Care:   [x] General Inpatient (GIP)    [] Routine   [] Respite    Location of Care:  [] [facility] [] [facility] [] [address] [] [facility] [x] [facility]  [] Home [] Other:      Date of Original Hospice Admission: 3-16-17  Hospice Attending: Kendra Meng MD    Principle Hospice Diagnosis: Sepsis (gram negative)  Diagnoses RELATED to the terminal prognosis: multiple advanced staged open wounds,  dementia with behavioral disturbances  Other Diagnoses: pt is [place] and legally blind, emaciated and cachectic     HOSPICE NARRATIVE COMPOSED BY PHYSICIAN   Rationale for a prognosis of life expectancy of 6 months or less if the disease follows its normal course:    Snow Qureshi is a 80y.o. year old who was admitted to [facility]. 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 Functionally, the patient's Palliative Performance Scale has declined over a period of weeks and is estimated at 20%. Objective information that support this patients limited prognosis includes: aspiration sepsis, wounds, cachexia, anorexia, severe dementia, severe agitation. The patient/family chose comfort measures with the support of Hospice. HOSPICE DIAGNOSES   Active Symptoms:  1. Severe agitation requiring constant evaluation: much less  2. Multiple advanced stage wounds  3. Generalized pain: ++  4. Shortness of breath  5. Profound weakness  6. Cachexia with severe muscle wasting  7. Dysphagia   8.   Increased airway/chest secretions:+       PLAN   1. Continue GIP for evaluation of agitation & pain which both are not well controlled at this time and for multiple advanced stage opened wounds. 2. Continue Haldol IV 2 mg q 3 hrs scheduled, he has needed the scheduled dosing due to severe agitation when he is not medicated  3. Continue dilaudid to 2mg IV q3h scheduled and q15 mts prn pain/restlessness. No extra medication needed  4. Continue scheduled robinul 0.2mg IV q4h to help with secretions & robinul 0.2mg Iv q2h prn secretions  5. Oxygen 2l nc cont  6. Lorazepam 1mg IV q 1 hr as needed for agitation, he is more calm now. One dose needed in last 24 hours  7. Dulcolax and senna  8. Continue  wound care 2 x day for drainage and keep dry as much as possible  9. Oxygen for comfort    10.  and SW to support family needs: son at bedside, family visiting regularly. 11. Disposition: home with family when symptoms are stable and he is managed by oral medications: unlikely as pt close to dying. Prognosis estimated based on 03/25/17 clinical assessment is:   [x] Few to Many Hours  [] Few to Many Days    [] Few to Many Weeks    [] Few to Many Months    Communicated plan of care with: Hospice Case Manager; Hospice IDT; Care Team     GOALS OF CARE     Resuscitation Status: DNR  Durable DNR: [x] Yes [] No    No flowsheet data found. HISTORY     History obtained from: chart, staff nurse, family    CHIEF COMPLAINT:N/A  The patient is:   [] Verbal  [x] Nonverbal  [x] Unresponsive    HPI/SUBJECTIVE: remains unresponsive but requiring all scheduled medications.  Son at bedside and he feels like dad is comfortable       REVIEW OF SYSTEMS     The following systems were: [] reviewed  [x] unable to be reviewed      Adult Non-Verbal Pain Assessment Score:4/10    Face  [] 0   No particular expression or smile  [x] 1   Occasional grimace, tearing, frowning, wrinkled forehead  [] 2   Frequent grimace, tearing, frowning, wrinkled forehead    Activity (movement)  [] 0   Lying quietly, normal position  [x] 1   Seeking attention through movement or slow, cautious movement  [] 2   Restless, excessive activity and/or withdrawal reflexes    Guarding  [] 0   Lying quietly, no positioning of hands over areas of body  [x] 1   Splinting areas of the body, tense  [] 2   Rigid, stiff    Physiology (vital signs)  [x] 0   Stable vital signs  [] 1   Change in any of the following: SBP > 20mm Hg; HR > 20/minute  [] 2   Change in any of the following: SBP > 30mm Hg; HR > 25/minute    Respiratory  [] 0   Baseline RR/SpO2, compliant with ventilator  [x] 1   RR > 10 above baseline, or 5% drop SpO2, mild asynchrony with ventilator  [] 2   RR > 20 above baseline, or 10% drop SpO2, asynchrony with ventilator     FUNCTIONAL ASSESSMENT     Palliative Performance Scale (PPS): 10%     PSYCHOSOCIAL/SPIRITUAL ASSESSMENT     Principal Problem:    Sepsis due to Gram negative bacteria (HCC) (3/21/2017)    Active Problems:    Agitation requiring sedation protocol (3/16/2017)      No past medical history on file. No past surgical history on file. Social History   Substance Use Topics    Smoking status: Not on file    Smokeless tobacco: Not on file    Alcohol use Not on file     No family history on file.    No Known Allergies   Current Facility-Administered Medications   Medication Dose Route Frequency    HYDROmorphone (PF) (DILAUDID) injection 2 mg  2 mg IntraVENous Q3H    glycopyrrolate (ROBINUL) injection 0.2 mg  0.2 mg IntraVENous Q4H    glycopyrrolate (ROBINUL) injection 0.2 mg  0.2 mg IntraVENous Q2H PRN    HYDROmorphone (PF) (DILAUDID) injection 2 mg  2 mg IntraVENous Q15MIN PRN    scopolamine (TRANSDERM-SCOP) 3 mg  3 mg TransDERmal Q72H    haloperidol lactate (HALDOL) injection 2 mg  2 mg IntraVENous Q3H    LORazepam (ATIVAN) injection 1 mg  1 mg IntraVENous Q1H PRN    nystatin (MYCOSTATIN) 100,000 unit/gram powder   Topical TID    acetaminophen (TYLENOL) suppository 650 mg  650 mg Rectal Q4H PRN    senna-docusate (PERICOLACE) 8.6-50 mg per tablet 2 Tab  2 Tab Oral BID PRN    bisacodyl (DULCOLAX) suppository 10 mg  10 mg Rectal DAILY PRN    ondansetron (ZOFRAN ODT) tablet 4 mg  4 mg Oral Q6H PRN        PHYSICAL EXAM     Wt Readings from Last 3 Encounters:   No data found for Wt       Visit Vitals    BP (!) 84/60 (BP 1 Location: Left arm)    Pulse 91    Temp 97.8 °F (36.6 °C)    Resp 17    SpO2 (!) 86%       Supplemental O2  [x] Yes  [x] NO  Last bowel movement: unknown    Currently this patient has:  [x] Peripheral IV [] PICC  [] PORT [] ICD    [x] Rich Catheter [] NG Tube   [] PEG Tube    [] Rectal Tube [] Drain  [] Other:     Constitutional : pt is lethargic, not responsive. Did not respond to my speaking, son at bedside  Eyes: closed  ENMT:  Some airway/chest secretions  Cardiovascular: HRR  Respiratory: sob with secretions   Gastrointestinal: snt bs+  Musculoskeletal:weakness noted with muscle wasting and cachexia  Skin: warm, dry  Neurologic: na  Psychiatric: restless intermittently  Other:    Pertinent Lab and or Imaging Tests:  No results found for: NA, K, CL, CO2, AGAP, GLU, BUN, CREA, BUCR, GFRAA, GFRNA, CA, GFRAA  No results found for: TP, ALBR, TALB, ALB        Total time: 35 min  Counseling / coordination time:   > 50% counseling / coordination?: y        This patient meets Hospice General Inpatient (GIP) Level of Care.       Supporting documentation for GIP need for pain control:  [x] Frequent evaluation by a doctor, nurse practitioner, nurse   [x] Frequent medication adjustment    [x] IVs that cannot be administered at home   [x] Aggressive pain management   [] Complicated technical delivery of medications              Supporting documentation for GIP need for symptom control:  [x]  Sudden decline necessitating intensive nursing intervention  []  Uncontrolled / intractable nausea or vomiting   []  Pathological fractures  [x]  Advanced open wounds requiring frequent skilled care  [] Unmanageable respiratory distress  [x] New or worsening delirium   [x] Delirium with behavior issues  [] Imminent death - with skilled nursing needs documented above  Charlee Oneill MD

## 2017-03-25 NOTE — PROGRESS NOTES
0700  Report received. 0745  Pt lying in bed. Pt is unresponsive. Mouth breathing. Lungs sounds course and diminished. O2 at 2lpm.  Bowel sounds absent. Last reported BM was 3-15.  9 No PO intake for several days). Calle is draining scant clement urine. Pt has +3 pitting edema in his lower ext and edema in his bilateral hands. Dressing on his bilateral ankles and and shins are dry and intact. (changed last night)  Pt has a left AC with a dry and intact dressing. Mr Michael Hancock did not respond during assessment. 930  Pt continues to rest comfortably. No complaints,  knees are cool to touch and mottling. Family and friends are at the bedside. 1100  Pt resting. No signs of distress noted. 1230   Pt turned and repositioned. Family continues to be at the bedside. No signs of distress noted. 1420  Pt medicated with Haldol, Robinul and Dilaudid IV. Respirations shallow but regular. No facial grimacing. Family at the bedside. 1600  Pt resting. No facial grimacing at this time. Family at the bedside. 1730  Pt turned and repositioned. No signs of distress. 1846  Pt's calle leaking. Rn repositioned calle and leaking stopped. .  Pt and CNA turned and repositioned pt. Mouth care given. Wounds. Sacrum is intact. Dressings on his bilateral shins are intact. Bilateral hips dressings are dry and intact. Family at the bedside. 1900  Report given. NAME OF PATIENT:  Cuca Altman.     LEVEL OF CARE:  GIP    REASON FOR GIP:   Pain, despite numerous changes in medications, Medication adjustment that must be monitored 24/7 and Stabilizing treatment that cannot take place at home    *PATIENT REMAINS ELIGIBLE FOR GIP LEVEL OF CARE AS EVIDENCED BY: (MUST BE ADDRESSED OF PATIENT GIP) Pt continues to receive scheduled IV Haldol, Dilaudid and Robinul,      O2 SAFETY:  O2 at 2lpm.    Oxygen sign on the door    FALL INTERVENTIONS PROVIDED:   Implemented/recommended use of non-skid footwear, Implemented/recommended resources for alarm system (personal alarm, bed alarm, call bell, etc.)  and Implemented/recommended increased supervision/assistance    INTERDISPLINARY COMMUNICATION/COLLABORATION:  Physician, MSW, [place] and RN, CNA    NEW MEDICATION INITIATION DOCUMENTATION:  No new medications initiated. COMFORTABLE DYING MEASURE:  Is Patient/family satisfied with symptom level?  yes    DISCHARGE PLAN:  Pt will remain at the [facility] until he passes away or until his family makes alternative living arrangements.

## 2017-03-26 NOTE — PROGRESS NOTES
0715:  Verbal shift change report given to Dipti Dodge RN (oncoming nurse) by Tam Lynch RN (offgoing nurse). Report included the following information SBAR, Kardex, Intake/Output and MAR.   1266:  Administered scheduled medications (see MAR) and assessed patient (see Simple Assessment); no s/s of distress. 0930:  Rounded on patient; no s/s of distress. 1040:  Rounded on patient; no s/s of distress. 1115:  Administered scheduled medications (see MAR); patient showing no s/s of distress. 1230:  Rounded; no s/s of distress. 1345:  Rounded; no s/s of distress. 1420:  Rounded; no s/s of distress. 1545:  Administered scheduled medications (see MAR); family present and gave update on patient. 1630:  Rounded on patient; no s/s of distress. 1725:  Administered scheduled medications; no s/s of distress. 1815:  Rounded; no s/s of distress. 1900:  Verbal shift change report given to Luc Amaro RN (oncoming nurse) by Dipti Dodge RN (offgoing nurse). Report included the following information SBAR, Kardex, Intake/Output and MAR.

## 2017-03-26 NOTE — PROGRESS NOTES
Louise Ordonez 139 report from [address] [place] [address], see flowsheet, lethargic no purposeful movement PIV no blood return, site pink will replace    2130 Calle still leaking and little urine in bag,  Removed, replaced with 16 fr calle, good urine return, flushed, madyson care and nystatin powder applied      2200 Pt tolerates turns and mouth care, allowing time for questions from family    18 Calle has put out greater than 1000, noted some sediment in tube flushed with 60 cc sterile saline at this time.     0000 Two attempts at starting another IV unsuccessful, current IV flushed well will continue to use at this time.        0600 Night shift summary - lethargic, hypotensive, pain and agitation well controlled, Family remains at bedside.     0700 Report to oncoming shift          NAME OF PATIENT: Judy Morales.      LEVEL OF CARE: GIP      REASON FOR GIP:   Pain, despite numerous changes in medications, Unmanageable respiratory distress, Terminal agitation, despite changes to medications, Medication adjustment that must be monitored 24/7 and Stabilizing treatment that cannot take place at home      *PATIENT REMAINS ELIGIBLE FOR GIP LEVEL OF CARE AS EVIDENCED BY: (MUST BE ADDRESSED OF PATIENT GIP) Continue GIP for evaluation of severe agitation and pain which both are not well controlled at this time and for multiple advanced stage opened wounds.       REASON FOR RESPITE:  n/a      O2 SAFETY:  Concentrator positioning (6\" from furniture/drapes), Tanks stored in pop , No petroleum based products on face while oxygen in use and Oxygen sign on the door      FALL INTERVENTIONS PROVIDED:   Implemented/recommended resources for alarm system (personal alarm, bed alarm, call bell, etc.) , Implemented/recommended environmental changes (remove hazards, lower bed, improve lighting, etc.) and Implemented/recommended increased supervision/assistance      INTERDISPLINARY COMMUNICATION/COLLABORATION:  Physician, MSW, [place] and RN, CNA      NEW MEDICATION INITIATION DOCUMENTATION:  n/a      Reason medication is being initiated: n/a      MD / Provider name consulted re: change in status / initiation of new medication: n/a      New Symptom(s): n/a      New Order(s): n/a      Name of the person notified of the changes: n/a      Name of person being taught: n/a      Instructions given: n/a      Side Effects taught: n/a      Response to teachin [address]:  Is Patient/family satisfied with symptom level? yes      DISCHARGE PLAN: Patient is declining and will  her at [facility].

## 2017-03-26 NOTE — PROGRESS NOTES
NAME OF PATIENT:  Benji Johnson LEVEL OF CARE:  GIP    REASON FOR GIP:   Pain, despite numerous changes in medications, Unmanageable respiratory distress, Terminal agitation, despite changes to medications, Medication adjustment that must be monitored 24/7 and Stabilizing treatment that cannot take place at home    *PATIENT REMAINS ELIGIBLE FOR GIP LEVEL OF CARE AS EVIDENCED BY: (MUST BE ADDRESSED OF PATIENT GIP) Continue GIP for evaluation of agitation & pain which both are not well controlled at this time and for multiple advanced stage opened wounds. REASON FOR RESPITE:  n/a    O2 SAFETY:  n/a    FALL INTERVENTIONS PROVIDED:   Implemented/recommended resources for alarm system (personal alarm, bed alarm, call bell, etc.) , Implemented/recommended environmental changes (remove hazards, lower bed, improve lighting, etc.) and Implemented/recommended increased supervision/assistance    INTERDISPLINARY COMMUNICATION/COLLABORATION:  Physician, MSW, [place] and RN, CNA    NEW MEDICATION INITIATION DOCUMENTATION:  n/a    Reason medication is being initiated:  n/a    MD / Provider name consulted re: change in status / initiation of new medication:  n/a    New Symptom(s):  n/a    New Order(s):  n/a    Name of the person notified of the changes:  n/a    Name of person being taught:  n/a    Instructions given:  n/a    Side Effects taught:  n/a    Response to teaching:  n/a      COMFORTABLE DYING MEASURE:  Is Patient/family satisfied with symptom level?  yes    DISCHARGE PLAN:  Patient is declining and will more than likely  here at [facility].

## 2017-03-26 NOTE — PROGRESS NOTES
[address] [place] [address] [facility]  [phone]    Patient Name: Beena Vance. YOB: 1928    Date of Provider Hospice Visit: 03/26/17    Level of Care:   [x] General Inpatient (GIP)    [] Routine   [] Respite    Location of Care:  [] [facility] [] [facility] [] [facility] [] [facility] [x] Hospice [facility]  [] Home [] Other:      Date of Original Hospice Admission: 3-16-17  Hospice Attending: Moise Sky MD    Principle Hospice Diagnosis: Sepsis (gram negative)  Diagnoses RELATED to the terminal prognosis: multiple advanced staged open wounds,  dementia with behavioral disturbances  Other Diagnoses: pt is [place] and legally blind, emaciated and cachectic     HOSPICE NARRATIVE COMPOSED BY PHYSICIAN   Rationale for a prognosis of life expectancy of 6 months or less if the disease follows its normal course:    Beena Vance. is a 80y.o. year old who was admitted to [facility]. 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 Functionally, the patient's Palliative Performance Scale has declined over a period of weeks and is estimated at 20%. Objective information that support this patients limited prognosis includes: aspiration sepsis, wounds, cachexia, anorexia, severe dementia, severe agitation. The patient/family chose comfort measures with the support of Hospice. HOSPICE DIAGNOSES   Active Symptoms:  1. Severe agitation requiring constant evaluation: much less  2. Multiple advanced stage wounds  3. Generalized pain: ++  4. Shortness of breath  5. Profound weakness  6. Cachexia with severe muscle wasting  7. Dysphagia   8.   Increased airway/chest secretions:+       PLAN   1. Continue GIP for evaluation of agitation & pain which both were not well controlled at admissions but also for multiple advanced stage opened wounds. 2. Continue Haldol IV 2 mg q 3 hrs scheduled, he has needed the scheduled dosing due to severe agitation when he is not medicated. Has not required additional medication since new dosing started. Appears comfortable. 3. Continue dilaudid to 2mg IV q3h scheduled and q15 mts prn pain/restlessness. No extra medication needed  4. Continue scheduled robinul 0.2mg IV q4h to help with secretions & robinul 0.2mg Iv q2h prn secretions  5. Oxygen 2l nc cont  6. Lorazepam 1mg IV q 1 hr as needed for agitation, he is more calm now. No extra dose in last 24 hours  7. Dulcolax and senna  8. Continue  wound care 2 x day for drainage and keep dry as much as possible  9. Oxygen for comfort    10.  and SW to support family needs: son at bedside, family visiting regularly. 11. Disposition: home with family when symptoms are stable and he is managed by oral medications: unlikely as pt close to dying. Prognosis estimated based on 03/26/17 clinical assessment is:   [x] Few to Many Hours  [] Few to Many Days    [] Few to Many Weeks    [] Few to Many Months    Communicated plan of care with: Hospice Case Manager; Hospice IDT; Care Team     GOALS OF CARE     Resuscitation Status: DNR  Durable DNR: [x] Yes [] No    No flowsheet data found. HISTORY     History obtained from: chart, staff nurse, family    CHIEF COMPLAINT:N/A  The patient is:   [] Verbal  [x] Nonverbal  [x] Unresponsive    HPI/SUBJECTIVE: remains unresponsive but requiring all scheduled medications.  Son at bedside and he feels like dad is comfortable       REVIEW OF SYSTEMS     The following systems were: [] reviewed  [x] unable to be reviewed      Adult Non-Verbal Pain Assessment Score:1/10    Face  [] 0   No particular expression or smile  [x] 1   Occasional grimace, tearing, frowning, wrinkled forehead  [] 2   Frequent grimace, tearing, frowning, wrinkled forehead    Activity (movement)  [x] 0   Lying quietly, normal position  [] 1   Seeking attention through movement or slow, cautious movement  [] 2   Restless, excessive activity and/or withdrawal reflexes    Guarding  [x] 0   Lying quietly, no positioning of hands over areas of body  [] 1   Splinting areas of the body, tense  [] 2   Rigid, stiff    Physiology (vital signs)  [x] 0   Stable vital signs  [] 1   Change in any of the following: SBP > 20mm Hg; HR > 20/minute  [] 2   Change in any of the following: SBP > 30mm Hg; HR > 25/minute    Respiratory  [x] 0   Baseline RR/SpO2, compliant with ventilator  [] 1   RR > 10 above baseline, or 5% drop SpO2, mild asynchrony with ventilator  [] 2   RR > 20 above baseline, or 10% drop SpO2, asynchrony with ventilator     FUNCTIONAL ASSESSMENT     Palliative Performance Scale (PPS): 10%     PSYCHOSOCIAL/SPIRITUAL ASSESSMENT     Principal Problem:    Sepsis due to Gram negative bacteria (HCC) (3/21/2017)    Active Problems:    Agitation requiring sedation protocol (3/16/2017)      No past medical history on file. No past surgical history on file. Social History   Substance Use Topics    Smoking status: Not on file    Smokeless tobacco: Not on file    Alcohol use Not on file     No family history on file.    No Known Allergies   Current Facility-Administered Medications   Medication Dose Route Frequency    acetaminophen (TYLENOL) suppository 650 mg  650 mg Rectal Q4H PRN    bisacodyl (DULCOLAX) suppository 10 mg  10 mg Rectal DAILY PRN    glycopyrrolate (ROBINUL) injection 0.2 mg  0.2 mg IntraVENous Q2H PRN    glycopyrrolate (ROBINUL) injection 0.2 mg  0.2 mg IntraVENous Q4H    haloperidol lactate (HALDOL) injection 2 mg  2 mg IntraVENous Q3H    HYDROmorphone (PF) (DILAUDID) injection 2 mg  2 mg IntraVENous Q3H    HYDROmorphone (PF) (DILAUDID) injection 2 mg  2 mg IntraVENous Q15MIN PRN    LORazepam (ATIVAN) injection 1 mg  1 mg IntraVENous Q1H PRN    nystatin (MYCOSTATIN) 100,000 unit/gram powder   Topical TID    ondansetron (ZOFRAN ODT) tablet 4 mg  4 mg Oral Q6H PRN    scopolamine (TRANSDERM-SCOP) 3 mg  3 mg TransDERmal Q72H    senna-docusate (PERICOLACE) 8.6-50 mg per tablet 2 Tab  2 Tab Oral BID PRN        PHYSICAL EXAM     Wt Readings from Last 3 Encounters:   No data found for Wt       Visit Vitals    BP (!) 76/58 (BP 1 Location: Right arm)    Pulse 81    Temp 97 °F (36.1 °C)    Resp 17    SpO2 (!) 87%       Supplemental O2  [x] Yes  [x] NO  Last bowel movement: unknown    Currently this patient has:  [x] Peripheral IV [] PICC  [] PORT [] ICD    [x] Rich Catheter [] NG Tube   [] PEG Tube    [] Rectal Tube [] Drain  [] Other:     Constitutional : pt is lethargic, not responsive. Did not respond to my speaking, son at bedside  Eyes: closed  ENMT:  Some airway/chest secretions  Cardiovascular: HRR  Respiratory: sob with secretions   Gastrointestinal: snt bs+  Musculoskeletal:weakness noted with muscle wasting and cachexia  Skin: warm, dry  Neurologic: na  Psychiatric: restless intermittently  Other:    Pertinent Lab and or Imaging Tests:  No results found for: NA, K, CL, CO2, AGAP, GLU, BUN, CREA, BUCR, GFRAA, GFRNA, CA, GFRAA  No results found for: TP, ALBR, TALB, ALB        Total time: 25 min  Counseling / coordination time:   > 50% counseling / coordination?: y        This patient meets Hospice General Inpatient (GIP) Level of Care.       Supporting documentation for GIP need for pain control:  [x] Frequent evaluation by a doctor, nurse practitioner, nurse   [x] Frequent medication adjustment    [x] IVs that cannot be administered at home   [x] Aggressive pain management   [] Complicated technical delivery of medications              Supporting documentation for GIP need for symptom control:  [x]  Sudden decline necessitating intensive nursing intervention  []  Uncontrolled / intractable nausea or vomiting   []  Pathological fractures  [x]  Advanced open wounds requiring frequent skilled care  [] Unmanageable respiratory distress  [x] New or worsening delirium   [x] Delirium with behavior issues  [] Imminent death - with skilled nursing needs documented above  Dortha Schwab, MD

## 2017-03-26 NOTE — [facility] IDG NURSE NOTES
Patient: Mai Lee. Date: 03/26/17  Time: 6:49 AM             SELVIN COM [facility]  Patient Name - Roma Vee  Episode -   Benefit Period-  Date of IDT Meeting 3/28/17    UPDATED COMPREHENSIVE ASSESSMENT   Neuro - lethargic, no purposeful movement, moves upper extremities slightly, contractures of knees, foot drop, responds to tactile stimuli and pain, will  mouth swab  Resp - mild scattered rhonchi room air, regular resp shallow  GI/ -  Dysphagia, NPO,  incontinent, calle to BSD, last BM 3/15, foul smelling urine, sedimented, calle was changed due to obstruction  Heart -  regular 98.1-83-16 saturation 87% BP 68/40 2000 3/26/17  Skin - multiple wounds, DTI bilateral hips, multiple stage 1 pressure ulcers legs and sacrum and DTI left ankle  PIV left ac placed 3/19/17  Family - stays with patient 24 hrs day    ATTENDING Physician Dr Sherly Lynch     Narrative DR Susan Daugherty diagnosis Sepsis  Principle Hospice Diagnosis: Sepsis (gram negative)  Diagnoses RELATED to the terminal prognosis: multiple advanced staged open wounds, dementia with behavioral disturbances  Other Diagnoses: pt is [place] and legally blind, emaciated and cachectic    Dileep García -  Dr Mendel Miller   Rationale for a prognosis of life expectancy of 6 months or less if the disease follows its normal course:     Mai Lee. is a 80y.o. year old who was [facility] to [facility]. 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 Functionally, the patient's Palliative Performance Scale has declined over a period of weeks and is estimated at 20%.  Objective information that support this patients limited prognosis includes: aspiration sepsis, wounds, cachexia, anorexia, severe dementia, severe agitation. The patient/family chose comfort measures with the support of Hospice.     HOSPICE DIAGNOSES   Active Symptoms:  1. Severe agitation requiring constant evaluation: much less  2. Multiple advanced stage wounds  3. Generalized pain: ++  4. Shortness of breath  5. Profound weakness  6. Cachexia with severe muscle wasting  7. Dysphagia   8. Increased airway/chest secretions:+        PLAN   1. Continue GIP for evaluation of agitation & pain which both were not well controlled at admissions but also for multiple advanced stage opened wounds. 2. Continue Haldol IV 2 mg q 3 hrs scheduled, he has needed the scheduled dosing due to severe agitation when he is not medicated. Has not required additional medication since new dosing started. Appears comfortable. 3. Continue dilaudid to 2mg IV q3h scheduled and q15 mts prn pain/restlessness. No extra medication needed  4. Continue scheduled robinul 0.2mg IV q4h to help with secretions & robinul 0.2mg Iv q2h prn secretions  5. Oxygen 2l nc cont  6. Lorazepam 1mg IV q 1 hr as needed for agitation, he is more calm now. No extra dose in last 24 hours  7. Dulcolax and senna  8. Continue wound care 2 x day for drainage and keep dry as much as possible  9. Oxygen for comfort     10.  and SW to support family needs: son at bedside, family visiting regularly.    11. Disposition: home with family when symptoms are stable and he is managed by oral medications: unlikely as pt close to dying.             SYMPTOMS Pain, agitation, multiple wounds     SIGNS BP 68/40 98.1-100-18 Ox sat 87%     LAB VALUES (when available)     KARNOFSKY 20%     FAST for all dementia 7F     Progression to DEPENDENCE WITH ADLs (include time frame) Total care patient       PRESSURE ULCERS DTI left hip, left ankle,  and multiple stage 1     DISEASE SPECIFIC Alzheimers, Sepsis     FALL RISK:small     PROBLEM: Pain, agitation secretions    GOAL:Pain free    INTERVENTIONS(INDIVIDUALIZED) Medicate prior to turning and wound care     Nursing Visit Frequency  Hospice Aide Visit Frequency       Signed by: Merry Banks RN

## 2017-03-27 NOTE — PROGRESS NOTES
1900 Recd report from [address] [facility] Louie, see flowsheet, lethargic, responds to tactile stimuli, nonverbal, no purposeful movement, dysphagia, NPO, calle and PIV patent, urine moderate malodorous    2130 Calle with increased sediment flushed to keep patent      2200 Pt tolerates turns and mouth care, allowing time for questions from family      0600 Night shift summary - lethargic, hypotensive, pain and agitation well controlled, Family remains at bedside.      0700 Report to oncoming shift          NAME OF PATIENT: Roland Hamm.      LEVEL OF CARE: GIP      REASON FOR GIP:   Pain, despite numerous changes in medications, Unmanageable respiratory distress, Terminal agitation, despite changes to medications, Medication adjustment that must be monitored 24/7 and Stabilizing treatment that cannot take place at home      *PATIENT REMAINS ELIGIBLE FOR GIP LEVEL OF CARE AS EVIDENCED BY: (MUST BE ADDRESSED OF PATIENT GIP) Continue GIP for evaluation of severe agitation and pain which both are not well controlled at this time and for multiple advanced stage opened wounds.       REASON FOR RESPITE:  n/a      O2 SAFETY:  Concentrator positioning (6\" from furniture/drapes), Tanks stored in pop , No petroleum based products on face while oxygen in use and Oxygen sign on the door      FALL INTERVENTIONS PROVIDED:   Implemented/recommended resources for alarm system (personal alarm, bed alarm, call bell, etc.) , Implemented/recommended environmental changes (remove hazards, lower bed, improve lighting, etc.) and Implemented/recommended increased supervision/assistance      INTERDISPLINARY COMMUNICATION/COLLABORATION:  Physician, MSW, [place] and RN, CNA      NEW MEDICATION INITIATION DOCUMENTATION:  n/a      Reason medication is being initiated: n/a      MD / Provider name consulted re: change in status / initiation of new medication: n/a      New Symptom(s): n/a      New Order(s): n/a      Name of the person notified of the changes: n/a      Name of person being taught: n/a      Instructions given: n/a      Side Effects taught: n/a      Response to teachin [address]:  Is Patient/family satisfied with symptom level? yes      DISCHARGE PLAN: Patient is declining and will  her at [facility].

## 2017-03-27 NOTE — [facility] IDG CHAPLAIN NOTES
Patient: Karel Styles. Date: 17  Time: 6:27 PM  SPIRITUAL ISSUES:  The pt is unresponsive and appears to be moving toward eol. His large family is at his bedside supporting each other. Pt is [place], his wife Mary Ann Wall; neither active in [employer] at this time but were quite active when first . Family is very active in [employer] and use fanta to copy. The Tansler zamora from [address] [place] [address] has performed an Anointing of the Sick as requested by the patients family  GRIEF:  His family display anticipatory grief but are accepting as this has been a slow decline with several ups and downs. COPING:  Pt and family appeared stressed by situation, but are coping appropriately. :   plans are complete  AVAILABLE SPIRITUAL RESOURCES:  Continue the use of a generalized [place] fanta-based counseling and spiritual encouragement approach with the pt and family and suggest the reliance upon both internal and external spiritual resources. Provide written resources as needed or requested. GOALS:  Continue to visit this pt and his family, to provide a ministry of present, familiarity, and [place] focused Spiritual Care and Support to assist both all with coping with his decline. Build trust and familiarity with the family. Validate the emotions and normalize the anticipatory grief of the family regarding this declining situation. Offer written spiritual material to the family as needed or requested and provide a listening presence if requested. PLAN:   Continue to visit this pt and his family, while he is @ [facility] and I am in this facility, or PRN as requested. Meet with additional family when/if possible  Coordinate with Care Team on POC.    Signed by: Lisa Perez

## 2017-03-27 NOTE — PROGRESS NOTES
0700: Shift report received from Sudha, [facility]. 1421: Went to Pt room, he was resting peacefully with his two sons at the bedside. Pt didn't show any signs of pain. 9952: Scheduled meds given, IV line flushes, no swelling at insertion site, but a small red [place] around the insertion site. We explained what we were doing to the sons, they didn't have any particular questions or concerns. Pt was turned, skin was assessed. All bandages are dry and intact. 1130: Pt resting comfortably. Came in to give him scheduled meds, sons were at the bedside. Pt is getting a full bath now. 1445: Checked on pt, some family is at the bedside, no complaints. 1700: Scheduled meds given, pt turned and assessed for pain. 1845: A lot of family at bedside, scheduled meds given. Pt was turned on his left, looking to his family so we decided to leave him on his left side a little bit longer so he can be turned towards all of his family. This was explained to the family and they agreed. NAME OF PATIENT:  Shanti Tuttle. LEVEL OF CARE:  GIP    REASON FOR GIP:   Pain, despite numerous changes in medications, Terminal agitation, despite changes to medications, Medication adjustment that must be monitored 24/7 and Stabilizing treatment that cannot take place at home    *PATIENT REMAINS ELIGIBLE FOR GIP LEVEL OF CARE AS EVIDENCED BY: (MUST BE ADDRESSED OF PATIENT GIP) Pt still shows sign of pain and agitation, the change in meds required to keep the pt comfortable needs to be monitored closely 24/7.       REASON FOR RESPITE:  n/a    O2 SAFETY:  n/a    FALL INTERVENTIONS PROVIDED:   Implemented/recommended environmental changes (remove hazards, lower bed, improve lighting, etc.)    INTERDISPLINARY COMMUNICATION/COLLABORATION:  Physician, MSW, [place] and RN, CNA    NEW MEDICATION INITIATION DOCUMENTATION:  Consulted AT MD to report change in pt status, Obtained Order from Provider for initiation of symptom relief medication /other medication needed and Documentation completed in Clinical Note in [address] [facility]    Reason medication is being initiated:  Pt kept showing sign of agitation and pain, especially turning him every 2 hours seems to be uncomfortable to the pt because of his contractures. MD / Provider name consulted re: change in status / initiation of new medication:  Dr. Татьяна Queen Symptom(s):  Increased agitation and increased pain    New Order(s):  Dilaudid increased from 2mg to 3mg every 4 hours; Lorazepam 0.5mg added for agitation. Name of the person notified of the changes:  Son Елена Zimmerman notified    Name of person being taught:  Еленаronny Zimmerman    Instructions given:  Purpose and reason for change of meds    Side Effects taught:  Increased sedation    Response to teaching:  Positive, no questions      COMFORTABLE DYING MEASURE:  Is Patient/family satisfied with symptom level?  Yes    DISCHARGE PLAN:  Remain at [facility] until symptoms stabilize or pt expires

## 2017-03-27 NOTE — PROGRESS NOTES
[facility]  [phone]    Patient Name: Brissa Farnsworth. YOB: 1928    Date of Provider Hospice Visit: 03/27/17    Level of Care:   [x] General Inpatient (GIP)    [] Routine   [] Respite    Location of Care:  [] [facility] [] [facility] [] [facility] [] [facility] [x] Hospice [facility]  [] Home [] Other:      Date of Original Hospice Admission: 3-16-17  Hospice Attending: Shady Navas MD    Principle Hospice Diagnosis: Sepsis (gram negative)  Diagnoses RELATED to the terminal prognosis: multiple advanced staged open wounds,  dementia with behavioral disturbances  Other Diagnoses: pt is [place] and legally blind, emaciated and cachectic     HOSPICE NARRATIVE COMPOSED BY PHYSICIAN   Rationale for a prognosis of life expectancy of 6 months or less if the disease follows its normal course:    Brissa Farnsworth. is a 80y.o. year old who was admitted to [address] [place] [address] from [facility] inpatient Palliative Medicine. 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 Functionally, the patient's Palliative Performance Scale has declined over a period of weeks and is estimated at 20%. Objective information that support this patients limited prognosis includes: aspiration sepsis, wounds, cachexia, anorexia, severe dementia, severe agitation. The patient/family chose comfort measures with the support of Hospice. HOSPICE DIAGNOSES   Active Symptoms:  1. Severe agitation requiring constant evaluation: much less  2. Multiple advanced stage wounds  3. Generalized pain: +++ non verbal cues  4. Shortness of breath  5. Profound weakness  6. Cachexia with severe muscle wasting  7. Dysphagia   8.   Increased airway/chest secretions:++       PLAN   1. Continue GIP for evaluation of agitation & pain which both were not well controlled at admissions but also for multiple advanced stage opened wounds. 2. Continue Haldol IV 2 mg q 3 hrs scheduled  3. Increase dilaudid to 3mg IV q3h scheduled and q15 mts prn pain/restlessness. 4. Continue scheduled robinul 0.2mg IV q4h to help with secretions & robinul 0.2mg Iv q2h prn secretions  5. Oxygen 2l nc cont  6. Schedule lorazepam 0.5mg IV q4h.   7. Lorazepam 1mg IV q 1 hr as needed for agitation, he is more calm now. 8. Dulcolax and senna  9. Continue  wound care 2 x day for drainage and keep dry as much as possible  10. Oxygen for comfort    11.  and SW to support family needs: son at bedside, family visiting regularly. 12. Disposition: home with family when symptoms are stable and he is managed by oral medications: unlikely as pt close to dying. Prognosis estimated based on 03/27/17 clinical assessment is:   [x] Few to Many Hours  [] Few to Many Days    [] Few to Many Weeks    [] Few to Many Months    Communicated plan of care with: Hospice Case Manager; Hospice IDT; Care Team     GOALS OF CARE     Resuscitation Status: DNR  Durable DNR: [x] Yes [] No    No flowsheet data found. HISTORY     History obtained from: chart, staff nurse, family    CHIEF COMPLAINT:N/A  The patient is:   [] Verbal  [x] Nonverbal  [x] Unresponsive    HPI/SUBJECTIVE: remains unresponsive but requiring all scheduled medications. Son at bedside and he feels like dad is comfortable.    However on exam pt showing signs of increased pain; non verbal cues+  Also with increased airway secretions/gurgling       REVIEW OF SYSTEMS     The following systems were: [] reviewed  [x] unable to be reviewed      Adult Non-Verbal Pain Assessment Score:6/10    Face  [] 0   No particular expression or smile  [x] 1   Occasional grimace, tearing, frowning, wrinkled forehead  [] 2   Frequent grimace, tearing, frowning, wrinkled forehead    Activity (movement)  [] 0   Lying quietly, normal position  [x] 1   Seeking attention through movement or slow, cautious movement  [] 2   Restless, excessive activity and/or withdrawal reflexes    Guarding  [] 0   Lying quietly, no positioning of hands over areas of body  [x] 1   Splinting areas of the body, tense  [] 2   Rigid, stiff    Physiology (vital signs)  [] 0   Stable vital signs  [] 1   Change in any of the following: SBP > 20mm Hg; HR > 20/minute  [x] 2   Change in any of the following: SBP > 30mm Hg; HR > 25/minute    Respiratory  [] 0   Baseline RR/SpO2, compliant with ventilator  [x] 1   RR > 10 above baseline, or 5% drop SpO2, mild asynchrony with ventilator  [] 2   RR > 20 above baseline, or 10% drop SpO2, asynchrony with ventilator     FUNCTIONAL ASSESSMENT     Palliative Performance Scale (PPS): 10%     PSYCHOSOCIAL/SPIRITUAL ASSESSMENT     Principal Problem:    Sepsis due to Gram negative bacteria (HCC) (3/21/2017)    Active Problems:    Agitation requiring sedation protocol (3/16/2017)      No past medical history on file. No past surgical history on file. Social History   Substance Use Topics    Smoking status: Not on file    Smokeless tobacco: Not on file    Alcohol use Not on file     No family history on file.    No Known Allergies   Current Facility-Administered Medications   Medication Dose Route Frequency    HYDROmorphone (PF) (DILAUDID) injection 3 mg  3 mg IntraVENous Q3H    LORazepam (ATIVAN) injection 0.5 mg  0.5 mg IntraVENous Q4H    acetaminophen (TYLENOL) suppository 650 mg  650 mg Rectal Q4H PRN    bisacodyl (DULCOLAX) suppository 10 mg  10 mg Rectal DAILY PRN    glycopyrrolate (ROBINUL) injection 0.2 mg  0.2 mg IntraVENous Q2H PRN    glycopyrrolate (ROBINUL) injection 0.2 mg  0.2 mg IntraVENous Q4H    haloperidol lactate (HALDOL) injection 2 mg  2 mg IntraVENous Q3H    HYDROmorphone (PF) (DILAUDID) injection 2 mg  2 mg IntraVENous Q15MIN PRN    LORazepam (ATIVAN) injection 1 mg  1 mg IntraVENous Q1H PRN    nystatin (MYCOSTATIN) 100,000 unit/gram powder   Topical TID    ondansetron (ZOFRAN ODT) tablet 4 mg  4 mg Oral Q6H PRN    scopolamine (TRANSDERM-SCOP) 3 mg  3 mg TransDERmal Q72H    senna-docusate (PERICOLACE) 8.6-50 mg per tablet 2 Tab  2 Tab Oral BID PRN        PHYSICAL EXAM     Wt Readings from Last 3 Encounters:   No data found for Wt       Visit Vitals    BP (!) 68/40 (BP 1 Location: Right arm, BP Patient Position: At rest;Supine)    Pulse 83    Temp 98.7 °F (37.1 °C)    Resp 20    SpO2 (!) 87%       Supplemental O2  [x] Yes  [x] NO  Last bowel movement: unknown    Currently this patient has:  [x] Peripheral IV [] PICC  [] PORT [] ICD    [x] Rich Catheter [] NG Tube   [] PEG Tube    [] Rectal Tube [] Drain  [] Other:     Constitutional : pt is lethargic, not responsive. son at bedside, increased grimacing/frowning;worse on touch/movements  Eyes: closed  ENMT:  airway/chest secretions +++  Cardiovascular: HRR  Respiratory: sob with secretions   Gastrointestinal: snt bs+  Musculoskeletal:weakness noted with muscle wasting and cachexia  Skin: warm, dry  Neurologic: na  Psychiatric: restless intermittently  Other:    Pertinent Lab and or Imaging Tests:  No results found for: NA, K, CL, CO2, AGAP, GLU, BUN, CREA, BUCR, GFRAA, GFRNA, CA, GFRAA  No results found for: TP, ALBR, TALB, ALB        Total time: 35 min  Counseling / coordination time: 13 mts discussing with staff, son  > 50% counseling / coordination?:         This patient meets Hospice General Inpatient (GIP) Level of Care.       Supporting documentation for GIP need for pain control:  [x] Frequent evaluation by a doctor, nurse practitioner, nurse   [x] Frequent medication adjustment    [x] IVs that cannot be administered at home   [x] Aggressive pain management   [] Complicated technical delivery of medications              Supporting documentation for GIP need for symptom control:  [x]  Sudden decline necessitating intensive nursing intervention  []  Uncontrolled / intractable nausea or vomiting   []  Pathological fractures  [x]  Advanced open wounds requiring frequent skilled care  [] Unmanageable respiratory distress  [x] New or worsening delirium   [x] Delirium with behavior issues  [] Imminent death - with skilled nursing needs documented above  Dimitri Mobley MD

## 2017-03-28 NOTE — PROGRESS NOTES
0700 [facility]. Pt quite in bed eyes open but no verbal or tactile response to stimuli. Son at the bedside. Resp are even and shallow. Nail beds are cyanotic and tips of fingers are cool. Feet are warm to touch. 0900 Scheduled meds given to manage pain and agitation to provide comfort. All wound dressings are dry and intact. 1145 Scheduled meds given to manage symptoms. Pts resp rate shallow. No family at bedside at this time. Pt repositioned to right side. He has no response to stimuli. 1400 Son at bedside. Explained some end of life symptoms noted. He states he is aware and is ready for him to be at rest. Pt turned to his right side. 1430 Son at the bedside. He noted respirations had ceased and he called to nurses station. Pt without apical heart rate or respirations. He is able to verbalize about how good his Dad was and how much he appreciates the care he received here at the [facility]. He is unsure of the  arrangements. His brother Keyanna Hernandez is in charge of that. He is making calls to family members to notify now. 1500 Family have chosen [facility] , [address]            NAME OF PATIENT:  Neftaly Haynes. LEVEL OF CARE:  GIP    REASON FOR GIP:   Pain, despite numerous changes in medications, Medication adjustment that must be monitored 24/7 and Stabilizing treatment that cannot take place at home    *PATIENT REMAINS ELIGIBLE FOR GIP LEVEL OF CARE AS EVIDENCED BY: (MUST BE ADDRESSED OF PATIENT GIP) ongoing assessment and med changes require to manage pain and agitation.       REASON FOR RESPITE:  na    O2 SAFETY:  na    FALL INTERVENTIONS PROVIDED:   Implemented/recommended use of fall risk identification flag to all team members, Implemented/recommended resources for alarm system (personal alarm, bed alarm, call bell, etc.) , Implemented/recommended environmental changes (remove hazards, lower bed, improve lighting, etc.) and Implemented/recommended increased supervision/assistance    INTERDISPLINARY COMMUNICATION/COLLABORATION:  Physician, GABE, [place] and RN, CNA    NEW MEDICATION INITIATION DOCUMENTATION:  na none indicated at this time    Reason medication is being initiated: na  MD / Provider name consulted re: change in status / initiation of new medication:  na    New Symptom(s):  na    New Order(s):  na  Name of the person notified of the changes:  na    Name of person being taught:  na  Instructions given:  na    Side Effects taught: na    Response to teaching:  na      COMFORTABLE DYING MEASURE:  Is Patient/family satisfied with symptom level?  yes    DISCHARGE PLAN:  Remain at [facility] until symptoms are managed or end of life

## 2017-03-28 NOTE — PROGRESS NOTES
[phone] Patient's body bagged and body tags applied. [facility] contacted for removal of the patient's body. 1600 Calls made by Reina Goode RN, to patient's son x2 and patient's daughter in-law in regards to personal belongings, no answer to the phone. 1718 Patient's body removed. Belongings that were left at the bedside sent with the patient. Belongings included: hat, rosary necklace, an envelope with paperwork, a shirt, and a towel. No call back received from the patient's family.

## 2017-03-28 NOTE — PROGRESS NOTES
NAME OF PATIENT:  France Luna. LEVEL OF CARE:  [facility]    REASON FOR GIP:   Pain, despite numerous changes in medications, Terminal agitation, despite changes to medications, Medication adjustment that must be monitored 24/7, Stabilizing treatment that cannot take place at home and extensive wound care. *PATIENT REMAINS ELIGIBLE FOR GIP LEVEL OF CARE AS EVIDENCED BY: (MUST BE ADDRESSED OF PATIENT GIP)  Per Dr. Michael Witt, Continue GIP for evaluation of agitation & pain which both were not well controlled at admissions but also for multiple advanced stage opened wounds. REASON FOR RESPITE:  n/a    O2 SAFETY:  Concentrator positioning (6\" from furniture/drapes), No petroleum based products on face while oxygen in use and Oxygen sign on the door    FALL INTERVENTIONS PROVIDED:   Implemented/recommended use of fall risk identification flag to all team members, Implemented/recommended environmental changes (remove hazards, lower bed, improve lighting, etc.) and Implemented/recommended increased supervision/assistance    INTERDISPLINARY COMMUNICATION/COLLABORATION:  Physician, MSW, [place] and RN, CNA    NEW MEDICATION INITIATION DOCUMENTATION:  No new medication    Reason medication is being initiated:  n/a    MD / Provider name consulted re: change in status / initiation of new medication:  n/a    New Symptom(s):  None    New Order(s):   None    Name of the person notified of the changes:  n/a    Name of person being taught:  n/a    Instructions given:  n/a    Side Effects taught:  N/a      Response to teaching:  n/a    COMFORTABLE DYING MEASURE: Unknown. DISCHARGE PLAN:  Return home with family after 5 days of Respite.

## 2017-03-28 NOTE — PROGRESS NOTES
1900 - Verbal shift change report given to Verna Tidwell (oncoming nurse) by Mehdi Noble and Mandy Del Angel (offgoing nurse). Report included the following information SBAR and Kardex. 2000 - Patient is unresponsive. Breathing is shallow and unlabored. Rich is patent and draining cloudy clement urine. Willie Rees is at bedside. 60/40. 99.2 - 87 - 21. 2018 - Scheduled Robinul 0.2mg IV, Haldol 2 mg IV and Dilaudid 3 mg IV given. #20 left antecubital peripheral IV site dated 3/19/2017; no redness, swelling, or leaking at site. 2025 - 2 friends visiting at bedside. 2100 - Another son arrived to be at the patient's bedside. 2130 - Repositioned and turned to lie on right side. 2143 - Scheduled Ativan 0.5 mg IV given. Scheduled Nystatin powder applied to groin area. 2346 - Scheduled Robinul 0.2 mg IV, Haldol 2 mg IV, and Dilaudid 3 mg IV given. 0000 - A third son arrived to be at the patient's bedside. All are spending the night. 0145 -  Scheduled Robinul 0.2 mg IV, Haldol 2 mg IV, and Dilaudid 3 mg IV given. 0350 - Temperature = 98.7 axillary. 0400 - Bath, shave, and linen change done by Lana Aguilar. Patient turned and repositioned every 2-3 hours. Occasional grimace while being turned. 0445 - Scheduled Robinul 0.2 mg IV, Haldol 2 mg IV, Ativan 0.5 mg IV, and Dilaudid 3 mg IV given. Breathing is even and unlabored.

## 2017-04-03 ENCOUNTER — HOME CARE VISIT (OUTPATIENT)
Dept: HOSPICE | Facility: HOSPICE | Age: 82
End: 2017-04-03
Payer: MEDICARE

## 2017-04-04 ENCOUNTER — HOME CARE VISIT (OUTPATIENT)
Dept: HOSPICE | Facility: HOSPICE | Age: 82
End: 2017-04-04
Payer: MEDICARE

## 2017-04-06 ENCOUNTER — HOME CARE VISIT (OUTPATIENT)
Dept: HOSPICE | Facility: HOSPICE | Age: 82
End: 2017-04-06
Payer: MEDICARE

## 2017-04-11 ENCOUNTER — HOME CARE VISIT (OUTPATIENT)
Dept: HOSPICE | Facility: HOSPICE | Age: 82
End: 2017-04-11
Payer: MEDICARE

## 2017-04-13 ENCOUNTER — HOME CARE VISIT (OUTPATIENT)
Dept: HOSPICE | Facility: HOSPICE | Age: 82
End: 2017-04-13
Payer: MEDICARE